# Patient Record
Sex: FEMALE | Race: WHITE | NOT HISPANIC OR LATINO
[De-identification: names, ages, dates, MRNs, and addresses within clinical notes are randomized per-mention and may not be internally consistent; named-entity substitution may affect disease eponyms.]

---

## 2019-05-31 ENCOUNTER — NON-APPOINTMENT (OUTPATIENT)
Age: 24
End: 2019-05-31

## 2019-05-31 ENCOUNTER — APPOINTMENT (OUTPATIENT)
Dept: INTERNAL MEDICINE | Facility: CLINIC | Age: 24
End: 2019-05-31
Payer: COMMERCIAL

## 2019-05-31 ENCOUNTER — RECORD ABSTRACTING (OUTPATIENT)
Age: 24
End: 2019-05-31

## 2019-05-31 VITALS
BODY MASS INDEX: 20.14 KG/M2 | HEIGHT: 64 IN | OXYGEN SATURATION: 99 % | SYSTOLIC BLOOD PRESSURE: 100 MMHG | WEIGHT: 118 LBS | HEART RATE: 62 BPM | DIASTOLIC BLOOD PRESSURE: 60 MMHG

## 2019-05-31 DIAGNOSIS — Z82.49 FAMILY HISTORY OF ISCHEMIC HEART DISEASE AND OTHER DISEASES OF THE CIRCULATORY SYSTEM: ICD-10-CM

## 2019-05-31 DIAGNOSIS — D22.9 MELANOCYTIC NEVI, UNSPECIFIED: ICD-10-CM

## 2019-05-31 DIAGNOSIS — Z80.8 FAMILY HISTORY OF MALIGNANT NEOPLASM OF OTHER ORGANS OR SYSTEMS: ICD-10-CM

## 2019-05-31 DIAGNOSIS — Z80.1 FAMILY HISTORY OF MALIGNANT NEOPLASM OF TRACHEA, BRONCHUS AND LUNG: ICD-10-CM

## 2019-05-31 DIAGNOSIS — Z82.3 FAMILY HISTORY OF STROKE: ICD-10-CM

## 2019-05-31 DIAGNOSIS — Z78.9 OTHER SPECIFIED HEALTH STATUS: ICD-10-CM

## 2019-05-31 LAB — CYTOLOGY CVX/VAG DOC THIN PREP: NORMAL

## 2019-05-31 PROCEDURE — 93000 ELECTROCARDIOGRAM COMPLETE: CPT

## 2019-05-31 PROCEDURE — 36415 COLL VENOUS BLD VENIPUNCTURE: CPT

## 2019-05-31 PROCEDURE — 99395 PREV VISIT EST AGE 18-39: CPT | Mod: 25

## 2019-06-04 LAB
25(OH)D3 SERPL-MCNC: 23.8 NG/ML
ALBUMIN SERPL ELPH-MCNC: 4.7 G/DL
ALP BLD-CCNC: 58 U/L
ALT SERPL-CCNC: 12 U/L
ANION GAP SERPL CALC-SCNC: 14 MMOL/L
AST SERPL-CCNC: 13 U/L
BASOPHILS # BLD AUTO: 0.03 K/UL
BASOPHILS NFR BLD AUTO: 0.4 %
BILIRUB SERPL-MCNC: 0.3 MG/DL
BUN SERPL-MCNC: 12 MG/DL
CALCIUM SERPL-MCNC: 9.6 MG/DL
CHLORIDE SERPL-SCNC: 108 MMOL/L
CHOLEST SERPL-MCNC: 166 MG/DL
CHOLEST/HDLC SERPL: 2.6 RATIO
CO2 SERPL-SCNC: 20 MMOL/L
CREAT SERPL-MCNC: 0.69 MG/DL
EOSINOPHIL # BLD AUTO: 0.1 K/UL
EOSINOPHIL NFR BLD AUTO: 1.4 %
ESTIMATED AVERAGE GLUCOSE: 97 MG/DL
GLUCOSE SERPL-MCNC: 87 MG/DL
HBA1C MFR BLD HPLC: 5 %
HCT VFR BLD CALC: 40.5 %
HCV AB SER QL: NONREACTIVE
HCV S/CO RATIO: 0.21 S/CO
HDLC SERPL-MCNC: 65 MG/DL
HGB BLD-MCNC: 12.9 G/DL
HIV1+2 AB SPEC QL IA.RAPID: NONREACTIVE
IMM GRANULOCYTES NFR BLD AUTO: 0.1 %
LDLC SERPL CALC-MCNC: 90 MG/DL
LYMPHOCYTES # BLD AUTO: 1.26 K/UL
LYMPHOCYTES NFR BLD AUTO: 17.6 %
MAN DIFF?: NORMAL
MCHC RBC-ENTMCNC: 29.2 PG
MCHC RBC-ENTMCNC: 31.9 GM/DL
MCV RBC AUTO: 91.6 FL
MONOCYTES # BLD AUTO: 0.42 K/UL
MONOCYTES NFR BLD AUTO: 5.9 %
NEUTROPHILS # BLD AUTO: 5.35 K/UL
NEUTROPHILS NFR BLD AUTO: 74.6 %
PLATELET # BLD AUTO: 219 K/UL
POTASSIUM SERPL-SCNC: 4.8 MMOL/L
PROT SERPL-MCNC: 7.2 G/DL
RBC # BLD: 4.42 M/UL
RBC # FLD: 13.2 %
SODIUM SERPL-SCNC: 142 MMOL/L
T4 SERPL-MCNC: 8.6 UG/DL
TRIGL SERPL-MCNC: 55 MG/DL
TSH SERPL-ACNC: 1.35 UIU/ML
VIT B12 SERPL-MCNC: 483 PG/ML
WBC # FLD AUTO: 7.17 K/UL

## 2019-06-04 NOTE — HISTORY OF PRESENT ILLNESS
[de-identified] : Patient here for an annual exam. Currently feels fine has no complaints today [FreeTextEntry1] : annual

## 2019-06-04 NOTE — HEALTH RISK ASSESSMENT
[No falls in past year] : Patient reported no falls in the past year [0] : 2) Feeling down, depressed, or hopeless: Not at all (0) [HIV Test offered] : HIV Test offered [Hepatitis C test offered] : Hepatitis C test offered [Very Good] : ~his/her~  mood as very good [] : No [MammogramDate] : never [PapSmearDate] : 2019 [ColonoscopyDate] : niecy [BoneDensityDate] : never

## 2020-02-14 ENCOUNTER — TRANSCRIPTION ENCOUNTER (OUTPATIENT)
Age: 25
End: 2020-02-14

## 2020-06-17 ENCOUNTER — APPOINTMENT (OUTPATIENT)
Dept: OBGYN | Facility: CLINIC | Age: 25
End: 2020-06-17
Payer: COMMERCIAL

## 2020-06-17 VITALS
WEIGHT: 120 LBS | SYSTOLIC BLOOD PRESSURE: 110 MMHG | BODY MASS INDEX: 20.49 KG/M2 | TEMPERATURE: 99.7 F | DIASTOLIC BLOOD PRESSURE: 80 MMHG | HEIGHT: 64 IN

## 2020-06-17 DIAGNOSIS — Z11.3 ENCOUNTER FOR SCREENING FOR INFECTIONS WITH A PREDOMINANTLY SEXUAL MODE OF TRANSMISSION: ICD-10-CM

## 2020-06-17 DIAGNOSIS — Z01.419 ENCOUNTER FOR GYNECOLOGICAL EXAMINATION (GENERAL) (ROUTINE) W/OUT ABNORMAL FINDINGS: ICD-10-CM

## 2020-06-17 PROCEDURE — 99395 PREV VISIT EST AGE 18-39: CPT

## 2020-06-22 LAB
C TRACH RRNA SPEC QL NAA+PROBE: NOT DETECTED
CYTOLOGY CVX/VAG DOC THIN PREP: NORMAL
N GONORRHOEA RRNA SPEC QL NAA+PROBE: NOT DETECTED
SOURCE TP AMPLIFICATION: NORMAL

## 2020-06-26 ENCOUNTER — RESULT REVIEW (OUTPATIENT)
Age: 25
End: 2020-06-26

## 2020-06-28 ENCOUNTER — RESULT REVIEW (OUTPATIENT)
Age: 25
End: 2020-06-28

## 2020-06-30 ENCOUNTER — RESULT REVIEW (OUTPATIENT)
Age: 25
End: 2020-06-30

## 2020-07-28 ENCOUNTER — TRANSCRIPTION ENCOUNTER (OUTPATIENT)
Age: 25
End: 2020-07-28

## 2020-07-28 ENCOUNTER — APPOINTMENT (OUTPATIENT)
Dept: INTERNAL MEDICINE | Facility: CLINIC | Age: 25
End: 2020-07-28
Payer: COMMERCIAL

## 2020-07-28 VITALS
DIASTOLIC BLOOD PRESSURE: 78 MMHG | HEIGHT: 64 IN | BODY MASS INDEX: 20.49 KG/M2 | HEART RATE: 64 BPM | SYSTOLIC BLOOD PRESSURE: 110 MMHG | WEIGHT: 120 LBS | OXYGEN SATURATION: 98 %

## 2020-07-28 PROCEDURE — 99395 PREV VISIT EST AGE 18-39: CPT | Mod: 25

## 2020-07-28 PROCEDURE — 36415 COLL VENOUS BLD VENIPUNCTURE: CPT

## 2020-07-28 PROCEDURE — G0442 ANNUAL ALCOHOL SCREEN 15 MIN: CPT

## 2020-07-29 LAB
25(OH)D3 SERPL-MCNC: 22.7 NG/ML
ALBUMIN SERPL ELPH-MCNC: 4.7 G/DL
ALP BLD-CCNC: 52 U/L
ALT SERPL-CCNC: 9 U/L
ANION GAP SERPL CALC-SCNC: 12 MMOL/L
AST SERPL-CCNC: 12 U/L
BASOPHILS # BLD AUTO: 0.02 K/UL
BASOPHILS NFR BLD AUTO: 0.3 %
BILIRUB SERPL-MCNC: 0.4 MG/DL
BUN SERPL-MCNC: 12 MG/DL
CALCIUM SERPL-MCNC: 9.5 MG/DL
CHLORIDE SERPL-SCNC: 103 MMOL/L
CHOLEST SERPL-MCNC: 176 MG/DL
CHOLEST/HDLC SERPL: 2.6 RATIO
CO2 SERPL-SCNC: 22 MMOL/L
CREAT SERPL-MCNC: 0.75 MG/DL
EOSINOPHIL # BLD AUTO: 0.12 K/UL
EOSINOPHIL NFR BLD AUTO: 2 %
ESTIMATED AVERAGE GLUCOSE: 103 MG/DL
GLUCOSE SERPL-MCNC: 102 MG/DL
HBA1C MFR BLD HPLC: 5.2 %
HCT VFR BLD CALC: 38.4 %
HDLC SERPL-MCNC: 67 MG/DL
HGB BLD-MCNC: 11.7 G/DL
IMM GRANULOCYTES NFR BLD AUTO: 0.3 %
LDLC SERPL CALC-MCNC: 98 MG/DL
LYMPHOCYTES # BLD AUTO: 1.55 K/UL
LYMPHOCYTES NFR BLD AUTO: 26.3 %
MAN DIFF?: NORMAL
MCHC RBC-ENTMCNC: 26.7 PG
MCHC RBC-ENTMCNC: 30.5 GM/DL
MCV RBC AUTO: 87.7 FL
MONOCYTES # BLD AUTO: 0.4 K/UL
MONOCYTES NFR BLD AUTO: 6.8 %
NEUTROPHILS # BLD AUTO: 3.79 K/UL
NEUTROPHILS NFR BLD AUTO: 64.3 %
PLATELET # BLD AUTO: 217 K/UL
POTASSIUM SERPL-SCNC: 4.4 MMOL/L
PROT SERPL-MCNC: 7 G/DL
RBC # BLD: 4.38 M/UL
RBC # FLD: 13.9 %
SODIUM SERPL-SCNC: 137 MMOL/L
T4 SERPL-MCNC: 8.5 UG/DL
TRIGL SERPL-MCNC: 54 MG/DL
TSH SERPL-ACNC: 2.23 UIU/ML
VIT B12 SERPL-MCNC: 431 PG/ML
WBC # FLD AUTO: 5.9 K/UL

## 2020-09-30 ENCOUNTER — APPOINTMENT (OUTPATIENT)
Dept: INTERNAL MEDICINE | Facility: CLINIC | Age: 25
End: 2020-09-30
Payer: COMMERCIAL

## 2020-09-30 DIAGNOSIS — R59.0 LOCALIZED ENLARGED LYMPH NODES: ICD-10-CM

## 2020-09-30 PROCEDURE — 99213 OFFICE O/P EST LOW 20 MIN: CPT

## 2020-10-01 PROBLEM — R59.0 ENLARGED LYMPH NODE IN NECK: Status: ACTIVE | Noted: 2020-10-01

## 2020-11-20 ENCOUNTER — APPOINTMENT (OUTPATIENT)
Dept: INTERNAL MEDICINE | Facility: CLINIC | Age: 25
End: 2020-11-20

## 2020-12-23 PROBLEM — Z01.419 ENCOUNTER FOR ANNUAL ROUTINE GYNECOLOGICAL EXAMINATION: Status: RESOLVED | Noted: 2020-06-17 | Resolved: 2020-12-23

## 2021-01-15 ENCOUNTER — APPOINTMENT (OUTPATIENT)
Dept: INTERNAL MEDICINE | Facility: CLINIC | Age: 26
End: 2021-01-15

## 2021-01-20 ENCOUNTER — TRANSCRIPTION ENCOUNTER (OUTPATIENT)
Age: 26
End: 2021-01-20

## 2021-02-11 ENCOUNTER — APPOINTMENT (OUTPATIENT)
Dept: INTERNAL MEDICINE | Facility: CLINIC | Age: 26
End: 2021-02-11
Payer: COMMERCIAL

## 2021-02-11 ENCOUNTER — NON-APPOINTMENT (OUTPATIENT)
Age: 26
End: 2021-02-11

## 2021-02-11 VITALS
OXYGEN SATURATION: 98 % | HEART RATE: 72 BPM | HEIGHT: 64 IN | WEIGHT: 120 LBS | BODY MASS INDEX: 20.49 KG/M2 | DIASTOLIC BLOOD PRESSURE: 78 MMHG | SYSTOLIC BLOOD PRESSURE: 110 MMHG

## 2021-02-11 DIAGNOSIS — R94.31 ABNORMAL ELECTROCARDIOGRAM [ECG] [EKG]: ICD-10-CM

## 2021-02-11 DIAGNOSIS — R07.89 OTHER CHEST PAIN: ICD-10-CM

## 2021-02-11 PROCEDURE — 99072 ADDL SUPL MATRL&STAF TM PHE: CPT

## 2021-02-11 PROCEDURE — 93000 ELECTROCARDIOGRAM COMPLETE: CPT

## 2021-02-11 PROCEDURE — 99214 OFFICE O/P EST MOD 30 MIN: CPT | Mod: 25

## 2021-02-12 ENCOUNTER — APPOINTMENT (OUTPATIENT)
Dept: INTERNAL MEDICINE | Facility: CLINIC | Age: 26
End: 2021-02-12

## 2021-02-15 PROBLEM — R94.31 ABNORMAL EKG: Status: ACTIVE | Noted: 2021-02-15

## 2021-02-16 PROBLEM — R07.89 MUSCULAR CHEST PAIN: Status: ACTIVE | Noted: 2021-02-15

## 2021-05-27 ENCOUNTER — APPOINTMENT (OUTPATIENT)
Dept: INTERNAL MEDICINE | Facility: CLINIC | Age: 26
End: 2021-05-27
Payer: COMMERCIAL

## 2021-05-27 VITALS
OXYGEN SATURATION: 98 % | SYSTOLIC BLOOD PRESSURE: 110 MMHG | WEIGHT: 120 LBS | HEART RATE: 78 BPM | DIASTOLIC BLOOD PRESSURE: 76 MMHG | BODY MASS INDEX: 20.49 KG/M2 | HEIGHT: 64 IN

## 2021-05-27 DIAGNOSIS — Z23 ENCOUNTER FOR IMMUNIZATION: ICD-10-CM

## 2021-05-27 DIAGNOSIS — Z11.1 ENCOUNTER FOR SCREENING FOR RESPIRATORY TUBERCULOSIS: ICD-10-CM

## 2021-05-27 PROCEDURE — 99214 OFFICE O/P EST MOD 30 MIN: CPT | Mod: 25

## 2021-05-27 PROCEDURE — 86580 TB INTRADERMAL TEST: CPT

## 2021-05-28 PROBLEM — Z11.1 PPD SCREENING TEST: Status: ACTIVE | Noted: 2021-05-28

## 2021-06-02 ENCOUNTER — RESULT REVIEW (OUTPATIENT)
Age: 26
End: 2021-06-02

## 2021-06-02 DIAGNOSIS — N83.8 OTHER NONINFLAMMATORY DISORDERS OF OVARY, FALLOPIAN TUBE AND BROAD LIGAMENT: ICD-10-CM

## 2021-06-03 ENCOUNTER — RESULT REVIEW (OUTPATIENT)
Age: 26
End: 2021-06-03

## 2021-06-07 ENCOUNTER — APPOINTMENT (OUTPATIENT)
Dept: GYNECOLOGIC ONCOLOGY | Facility: CLINIC | Age: 26
End: 2021-06-07
Payer: COMMERCIAL

## 2021-06-07 ENCOUNTER — NON-APPOINTMENT (OUTPATIENT)
Age: 26
End: 2021-06-07

## 2021-06-07 ENCOUNTER — TRANSCRIPTION ENCOUNTER (OUTPATIENT)
Age: 26
End: 2021-06-07

## 2021-06-07 VITALS
RESPIRATION RATE: 19 BRPM | HEIGHT: 64 IN | SYSTOLIC BLOOD PRESSURE: 136 MMHG | HEART RATE: 100 BPM | BODY MASS INDEX: 20.49 KG/M2 | DIASTOLIC BLOOD PRESSURE: 91 MMHG | TEMPERATURE: 98.2 F | WEIGHT: 120 LBS | OXYGEN SATURATION: 99 %

## 2021-06-07 VITALS
HEIGHT: 64 IN | DIASTOLIC BLOOD PRESSURE: 87 MMHG | RESPIRATION RATE: 18 BRPM | WEIGHT: 133.38 LBS | TEMPERATURE: 98 F | HEART RATE: 102 BPM | OXYGEN SATURATION: 97 % | SYSTOLIC BLOOD PRESSURE: 127 MMHG

## 2021-06-07 DIAGNOSIS — Z01.818 ENCOUNTER FOR OTHER PREPROCEDURAL EXAMINATION: ICD-10-CM

## 2021-06-07 PROCEDURE — 99205 OFFICE O/P NEW HI 60 MIN: CPT

## 2021-06-07 NOTE — ASU PATIENT PROFILE, ADULT - PMH
Abdominal cramping    Dense breast  left breast mass  Enlarged lymph node    Fibroid    Ovarian mass    Pectus excavatum

## 2021-06-08 ENCOUNTER — APPOINTMENT (OUTPATIENT)
Dept: GYNECOLOGIC ONCOLOGY | Facility: HOSPITAL | Age: 26
End: 2021-06-08

## 2021-06-08 ENCOUNTER — INPATIENT (INPATIENT)
Facility: HOSPITAL | Age: 26
LOS: 1 days | Discharge: HOME | DRG: 738 | End: 2021-06-10
Attending: OBSTETRICS & GYNECOLOGY | Admitting: OBSTETRICS & GYNECOLOGY
Payer: COMMERCIAL

## 2021-06-08 ENCOUNTER — RESULT REVIEW (OUTPATIENT)
Age: 26
End: 2021-06-08

## 2021-06-08 DIAGNOSIS — N83.202 UNSPECIFIED OVARIAN CYST, LEFT SIDE: ICD-10-CM

## 2021-06-08 DIAGNOSIS — C56.2 MALIGNANT NEOPLASM OF LEFT OVARY: ICD-10-CM

## 2021-06-08 DIAGNOSIS — Q67.6 PECTUS EXCAVATUM: Chronic | ICD-10-CM

## 2021-06-08 PROBLEM — R10.9 UNSPECIFIED ABDOMINAL PAIN: Chronic | Status: ACTIVE | Noted: 2021-06-07

## 2021-06-08 PROBLEM — D21.9 BENIGN NEOPLASM OF CONNECTIVE AND OTHER SOFT TISSUE, UNSPECIFIED: Chronic | Status: ACTIVE | Noted: 2021-06-07

## 2021-06-08 PROBLEM — R59.9 ENLARGED LYMPH NODES, UNSPECIFIED: Chronic | Status: ACTIVE | Noted: 2021-06-07

## 2021-06-08 LAB
ALBUMIN SERPL ELPH-MCNC: 4.7 G/DL
ALP BLD-CCNC: 56 U/L
ALT SERPL-CCNC: 13 U/L
ANION GAP SERPL CALC-SCNC: 14 MMOL/L
APTT BLD: 32.7 SEC
AST SERPL-CCNC: 16 U/L
BASOPHILS # BLD AUTO: 0.02 K/UL
BASOPHILS NFR BLD AUTO: 0.3 %
BILIRUB SERPL-MCNC: 0.5 MG/DL
BLD GP AB SCN SERPL QL: NEGATIVE — SIGNIFICANT CHANGE UP
BUN SERPL-MCNC: 9 MG/DL
CALCIUM SERPL-MCNC: 9.8 MG/DL
CANCER AG125 SERPL-ACNC: 19 U/ML
CANCER AG19-9 SERPL-ACNC: 10 U/ML
CEA SERPL-MCNC: 0.9 NG/ML
CHLORIDE SERPL-SCNC: 102 MMOL/L
CO2 SERPL-SCNC: 22 MMOL/L
CREAT SERPL-MCNC: 0.73 MG/DL
EOSINOPHIL # BLD AUTO: 0.06 K/UL
EOSINOPHIL NFR BLD AUTO: 1 %
ESTIMATED AVERAGE GLUCOSE: 97 MG/DL
HBA1C MFR BLD HPLC: 5 %
HCT VFR BLD CALC: 39.7 %
HGB BLD-MCNC: 12.8 G/DL
IMM GRANULOCYTES NFR BLD AUTO: 0.5 %
INR PPP: 1.1 RATIO
LYMPHOCYTES # BLD AUTO: 1.05 K/UL
LYMPHOCYTES NFR BLD AUTO: 18.3 %
MAN DIFF?: NORMAL
MCHC RBC-ENTMCNC: 28.6 PG
MCHC RBC-ENTMCNC: 32.2 GM/DL
MCV RBC AUTO: 88.8 FL
MONOCYTES # BLD AUTO: 0.3 K/UL
MONOCYTES NFR BLD AUTO: 5.2 %
NEUTROPHILS # BLD AUTO: 4.27 K/UL
NEUTROPHILS NFR BLD AUTO: 74.7 %
PLATELET # BLD AUTO: 221 K/UL
POTASSIUM SERPL-SCNC: 4.2 MMOL/L
PROT SERPL-MCNC: 7.5 G/DL
PT BLD: 13 SEC
RBC # BLD: 4.47 M/UL
RBC # FLD: 13.5 %
RH IG SCN BLD-IMP: POSITIVE — SIGNIFICANT CHANGE UP
SODIUM SERPL-SCNC: 138 MMOL/L
WBC # FLD AUTO: 5.73 K/UL

## 2021-06-08 PROCEDURE — 88307 TISSUE EXAM BY PATHOLOGIST: CPT | Mod: 26

## 2021-06-08 PROCEDURE — 88331 PATH CONSLTJ SURG 1 BLK 1SPC: CPT | Mod: 26

## 2021-06-08 PROCEDURE — 58950 RESECT OVARIAN MALIGNANCY: CPT

## 2021-06-08 PROCEDURE — 88342 IMHCHEM/IMCYTCHM 1ST ANTB: CPT | Mod: 26

## 2021-06-08 PROCEDURE — 88341 IMHCHEM/IMCYTCHM EA ADD ANTB: CPT | Mod: 26

## 2021-06-08 PROCEDURE — 88305 TISSUE EXAM BY PATHOLOGIST: CPT | Mod: 26

## 2021-06-08 PROCEDURE — 88304 TISSUE EXAM BY PATHOLOGIST: CPT | Mod: 26

## 2021-06-08 RX ORDER — SIMETHICONE 80 MG/1
80 TABLET, CHEWABLE ORAL EVERY 8 HOURS
Refills: 0 | Status: DISCONTINUED | OUTPATIENT
Start: 2021-06-08 | End: 2021-06-10

## 2021-06-08 RX ORDER — ACETAMINOPHEN 500 MG
1000 TABLET ORAL ONCE
Refills: 0 | Status: COMPLETED | OUTPATIENT
Start: 2021-06-08 | End: 2022-05-07

## 2021-06-08 RX ORDER — SENNA PLUS 8.6 MG/1
1 TABLET ORAL ONCE
Refills: 0 | Status: DISCONTINUED | OUTPATIENT
Start: 2021-06-08 | End: 2021-06-10

## 2021-06-08 RX ORDER — BUPIVACAINE 13.3 MG/ML
20 INJECTION, SUSPENSION, LIPOSOMAL INFILTRATION ONCE
Refills: 0 | Status: DISCONTINUED | OUTPATIENT
Start: 2021-06-08 | End: 2021-06-10

## 2021-06-08 RX ORDER — OXYCODONE HYDROCHLORIDE 5 MG/1
5 TABLET ORAL EVERY 6 HOURS
Refills: 0 | Status: DISCONTINUED | OUTPATIENT
Start: 2021-06-08 | End: 2021-06-10

## 2021-06-08 RX ORDER — HYDROMORPHONE HYDROCHLORIDE 2 MG/ML
0.2 INJECTION INTRAMUSCULAR; INTRAVENOUS; SUBCUTANEOUS
Refills: 0 | Status: DISCONTINUED | OUTPATIENT
Start: 2021-06-08 | End: 2021-06-09

## 2021-06-08 RX ORDER — KETOROLAC TROMETHAMINE 30 MG/ML
30 SYRINGE (ML) INJECTION EVERY 6 HOURS
Refills: 0 | Status: DISCONTINUED | OUTPATIENT
Start: 2021-06-08 | End: 2021-06-09

## 2021-06-08 RX ORDER — OXYCODONE HYDROCHLORIDE 5 MG/1
10 TABLET ORAL EVERY 6 HOURS
Refills: 0 | Status: DISCONTINUED | OUTPATIENT
Start: 2021-06-08 | End: 2021-06-10

## 2021-06-08 RX ORDER — PANTOPRAZOLE SODIUM 20 MG/1
40 TABLET, DELAYED RELEASE ORAL
Refills: 0 | Status: DISCONTINUED | OUTPATIENT
Start: 2021-06-08 | End: 2021-06-10

## 2021-06-08 RX ORDER — KETOROLAC TROMETHAMINE 30 MG/ML
30 SYRINGE (ML) INJECTION ONCE
Refills: 0 | Status: DISCONTINUED | OUTPATIENT
Start: 2021-06-08 | End: 2021-06-08

## 2021-06-08 RX ORDER — IBUPROFEN 200 MG
600 TABLET ORAL EVERY 6 HOURS
Refills: 0 | Status: COMPLETED | OUTPATIENT
Start: 2021-06-08

## 2021-06-08 RX ORDER — ACETAMINOPHEN 500 MG
1000 TABLET ORAL ONCE
Refills: 0 | Status: COMPLETED | OUTPATIENT
Start: 2021-06-08 | End: 2021-06-08

## 2021-06-08 RX ORDER — SODIUM CHLORIDE 9 MG/ML
1000 INJECTION, SOLUTION INTRAVENOUS
Refills: 0 | Status: DISCONTINUED | OUTPATIENT
Start: 2021-06-08 | End: 2021-06-09

## 2021-06-08 RX ORDER — HEPARIN SODIUM 5000 [USP'U]/ML
5000 INJECTION INTRAVENOUS; SUBCUTANEOUS ONCE
Refills: 0 | Status: COMPLETED | OUTPATIENT
Start: 2021-06-08 | End: 2021-06-08

## 2021-06-08 RX ORDER — GABAPENTIN 400 MG/1
600 CAPSULE ORAL ONCE
Refills: 0 | Status: COMPLETED | OUTPATIENT
Start: 2021-06-08 | End: 2021-06-08

## 2021-06-08 RX ORDER — CELECOXIB 200 MG/1
400 CAPSULE ORAL ONCE
Refills: 0 | Status: COMPLETED | OUTPATIENT
Start: 2021-06-08 | End: 2021-06-08

## 2021-06-08 RX ORDER — ONDANSETRON 8 MG/1
8 TABLET, FILM COATED ORAL EVERY 8 HOURS
Refills: 0 | Status: DISCONTINUED | OUTPATIENT
Start: 2021-06-08 | End: 2021-06-10

## 2021-06-08 RX ADMIN — HEPARIN SODIUM 5000 UNIT(S): 5000 INJECTION INTRAVENOUS; SUBCUTANEOUS at 12:04

## 2021-06-08 RX ADMIN — Medication 30 MILLIGRAM(S): at 23:20

## 2021-06-08 RX ADMIN — Medication 1000 MILLIGRAM(S): at 12:04

## 2021-06-08 RX ADMIN — CELECOXIB 400 MILLIGRAM(S): 200 CAPSULE ORAL at 12:04

## 2021-06-08 RX ADMIN — Medication 30 MILLIGRAM(S): at 23:09

## 2021-06-08 RX ADMIN — GABAPENTIN 600 MILLIGRAM(S): 400 CAPSULE ORAL at 12:04

## 2021-06-08 RX ADMIN — SIMETHICONE 80 MILLIGRAM(S): 80 TABLET, CHEWABLE ORAL at 21:12

## 2021-06-08 RX ADMIN — Medication 1000 MILLIGRAM(S): at 23:09

## 2021-06-08 NOTE — BRIEF OPERATIVE NOTE - NSICDXBRIEFPROCEDURE_GEN_ALL_CORE_FT
PROCEDURES:  Left salpingoophorectomy 08-Jun-2021 15:15:17  Berenice Stone  Omentectomy 08-Jun-2021 15:15:29  Berenice Stone  Appendectomy, open 08-Jun-2021 15:15:51  Berenice Stone

## 2021-06-08 NOTE — BRIEF OPERATIVE NOTE - SPECIMENS
Left ovary, mass and fallopian tube Left ovary, lef adnexal mass and left fallopian tube, pelvic washings

## 2021-06-08 NOTE — H&P ADULT - ASSESSMENT
26y  presenting for scheduled open salpingo-oophorectomy for large left adnexal mass   -admit to GYN for scheduled surgery  -anticipate overnight stay for open surgery   -NPO  -IV Fluids  -ERAS protocol   -Anesthesia consult

## 2021-06-08 NOTE — PHYSICAL EXAM
[Normal] : Recto-Vaginal Exam: Normal [Fully active, able to carry on all pre-disease performance without restriction] : Status 0 - Fully active, able to carry on all pre-disease performance without restriction [Abnormal] : Adnexa(ae): Abnormal [de-identified] : soft nontender smooth mass to umbilicus

## 2021-06-08 NOTE — BRIEF OPERATIVE NOTE - OPERATION/FINDINGS
Abdominal mass palpated up to zyphoid. Abdominal mass palpated up to zyphoid. Midline ex lap incision made, pelvic washings completed, LSO completed with ligasure. Uterus and right fallopian tube/ovary grossly normal appearing. Frozen section sent to pathology, possible mucinous borderline tumor, so omentectomy and appendectomy performed. Specimens to pathology. Fascia closed with loop PDS suture. SubQ closed with vicyryl. Skin closed with monocryl subcuticular sutures.

## 2021-06-08 NOTE — HISTORY OF PRESENT ILLNESS
[FreeTextEntry1] : Problem\par 1) Pelvic Mass\par \par \par Previous Therapy\par 1) Abdominal US 6/2/21\par     a) Large complex multiseptated cystic lesion int he abdomen and pelvis [id number] concerning for ovarian carcinoma, R sided mild hydronephrosis\par 2) CTAP 6/3/21\par   a) [id number] L adnexa mass imaging features consistent with mucinous cystic neoplasm\par \par 27 yo referred by Dr. Savage for large pelvic mass.

## 2021-06-08 NOTE — DISCUSSION/SUMMARY
[Reviewed Clinical Lab Test(s)] : Results of clinical tests were reviewed. [Reviewed Radiology Report(s)] : Radiology reports were reviewed. [Reviewed Radiology Film/Image(s)] : Images from radiology studies were reviewed and examined. [Discuss Alternatives/Risks/Benefits w/Patient] : All alternatives, risks, and benefits were discussed with the patient/family and all questions were answered.  Patient expressed good understanding and appreciates the importance of follow up as recommended. [Pre Op] : The differential diagnosis was discussed in detail. The indications, risks, benefits and alternatives were discussed. [unfilled] expressed an understanding of the treatment rationale and her questions were answered to her apparent satisfaction. [FreeTextEntry2] : ABDOMINOPELVIC MASS SUSPICIOUS FOR MALIGNANCY [FreeTextEntry1] : Note modified due to review of imaging with Radiologist and re counselled patient and  via phone at 11:00 am on 6/8/2021:\par \par I discussed with the patient and  with the aid of diagrams, reviewed the findings on history and physical examination, and reviewed the imaging studies in detail.   ACOG management of adnexal masses has been reviewed. \par \par We discussed the differential diagnosis which includes benign, low malignant potential tumors and malignancy. Other etiologies of adnexal masses, such as metastatic disease from another organ sites also discussed.\par \par we discussed the findings concerning for malignancy (such as solid components, increasing size, vascular septations, and elevated tumor markers) and this is included in the differntial diagosis and surgical approach.\par \par Laparoscopic cystectomy vs. unilateral salpingoophorectomy discussed. Increased risk of cyst rupture with ovarian cystectomy explained. In cases of borderline or malignancy, cyst rupture will result in upstaging and in some cases the need for chemotherapy that would not be necessary if cyst rupture was avoided. In cases of cyst a/w with endometriosis, cyst rupture is often unavoidable 2' to extensive adhesions. Cyst rupture can increase the risk of recurrence in both borderline and malignant tumors. If findings grossly concerning for malignancy a frozen section will be performed; and the appropriate additional management including but not limited to pelvic and paraaortic lymph node dissection, omentectomy and appendectomy. \par Fertility sparing management can be considered, with the understanding that additional surgery in the future will likely be necessary.  NCCN guidelines discussed.  Possible laparotomy also discussed. If diagnosis is not clear on frozen section, we will avoid any additional procedures and defer to permanent pathology. This may require additional surgery and the patient states understanding this.\par \par Complications that include, but are not limited to: bleeding, infection, injury to other organs including bowel, bladder, ureters, blood vessels, nerves; infections, blood clots, lymphedema, pneumonia, wound complications and prolonged hospital stay have all been discussed with the patient. Whenever minimally invasive surgery is attempted, there is a chance of needing to convert to laparotomy. The risk of occult injury requiring additional surgery also discussed. I have also provided her with the diagrams.  \par \par Surgical scheduling was discussed and instructions for optimization prior to surgery were given. will follow the Enhanced Recovery After Surgery (ERAS) protocol.  \par No aspirin or NSAID products for 1 week prior. \par She will choose a surgical date.\par Medically Cleared\par laparotomy via MLV, USO and frozen section.  possible staging.  \par tumor markers are normal\par \par The total encounter time was 60 minutes, of which over 50% was spent face-to-face, examining and counseling the patient, or coordinating care.  \par \par

## 2021-06-08 NOTE — BRIEF OPERATIVE NOTE - NSICDXBRIEFPOSTOP_GEN_ALL_CORE_FT
POST-OP DIAGNOSIS:  Left ovarian cyst 08-Jun-2021 15:16:43  Berenice Stone  Ovarian tumor of borderline malignancy, left 08-Jun-2021 15:17:02  Berenice Stone

## 2021-06-08 NOTE — H&P ADULT - HISTORY OF PRESENT ILLNESS
26y  presenting for scheduled salpingo-oophorectomy. The patient was referred by her Ob/Gyn Dr. Savage for a large pelvic mass. CT scan 6/3 showed large left adnexal mass, [id number] with cystic loculations. Tumor markers were wnl.     OBHx: G1 - VTOP D&C   Gyn H/x:  H/x of cysts, fibroids, endometriosis: adnexal mass as above   STIs: denies h/x of HIV, RPR, Hepatitis, G/C, Trich  PMH: Pectus excavatum s/p surgery in childhood    PSH: As a child pt had sx for pectus excavatum with metal bars placed   Meds: none  NKDA       T(C): 36.6 (21 @ 16:35), Max: 36.6 (21 @ 16:35)  HR: 102 (21 @ 16:35) (102 - 102)  BP: 127/87 (21 @ 16:35) (127/87 - 127/87)  RR: 18 (21 @ 16:35) (18 - 18)  SpO2: 97% (21 @ 16:35) (97% - 97%)    PHYSICAL EXAM:   Gen: NAD  : EUA In OR pending     LABS:  H 12.8

## 2021-06-09 ENCOUNTER — TRANSCRIPTION ENCOUNTER (OUTPATIENT)
Age: 26
End: 2021-06-09

## 2021-06-09 ENCOUNTER — RESULT REVIEW (OUTPATIENT)
Age: 26
End: 2021-06-09

## 2021-06-09 LAB
ANION GAP SERPL CALC-SCNC: 9 MMOL/L — SIGNIFICANT CHANGE UP (ref 5–17)
BUN SERPL-MCNC: 5 MG/DL — LOW (ref 7–23)
CALCIUM SERPL-MCNC: 9 MG/DL — SIGNIFICANT CHANGE UP (ref 8.4–10.5)
CHLORIDE SERPL-SCNC: 105 MMOL/L — SIGNIFICANT CHANGE UP (ref 96–108)
CO2 SERPL-SCNC: 25 MMOL/L — SIGNIFICANT CHANGE UP (ref 22–31)
CREAT SERPL-MCNC: 0.7 MG/DL — SIGNIFICANT CHANGE UP (ref 0.5–1.3)
GLUCOSE SERPL-MCNC: 100 MG/DL — HIGH (ref 70–99)
HCT VFR BLD CALC: 33.5 % — LOW (ref 34.5–45)
HGB BLD-MCNC: 10.6 G/DL — LOW (ref 11.5–15.5)
MAGNESIUM SERPL-MCNC: 1.9 MG/DL — SIGNIFICANT CHANGE UP (ref 1.6–2.6)
MCHC RBC-ENTMCNC: 28.4 PG — SIGNIFICANT CHANGE UP (ref 27–34)
MCHC RBC-ENTMCNC: 31.6 GM/DL — LOW (ref 32–36)
MCV RBC AUTO: 89.8 FL — SIGNIFICANT CHANGE UP (ref 80–100)
NRBC # BLD: 0 /100 WBCS — SIGNIFICANT CHANGE UP (ref 0–0)
PHOSPHATE SERPL-MCNC: 3.8 MG/DL — SIGNIFICANT CHANGE UP (ref 2.5–4.5)
PLATELET # BLD AUTO: 147 K/UL — LOW (ref 150–400)
POTASSIUM SERPL-MCNC: 4.4 MMOL/L — SIGNIFICANT CHANGE UP (ref 3.5–5.3)
POTASSIUM SERPL-SCNC: 4.4 MMOL/L — SIGNIFICANT CHANGE UP (ref 3.5–5.3)
RBC # BLD: 3.73 M/UL — LOW (ref 3.8–5.2)
RBC # FLD: 13.3 % — SIGNIFICANT CHANGE UP (ref 10.3–14.5)
SODIUM SERPL-SCNC: 139 MMOL/L — SIGNIFICANT CHANGE UP (ref 135–145)
WBC # BLD: 6.55 K/UL — SIGNIFICANT CHANGE UP (ref 3.8–10.5)
WBC # FLD AUTO: 6.55 K/UL — SIGNIFICANT CHANGE UP (ref 3.8–10.5)

## 2021-06-09 PROCEDURE — 88305 TISSUE EXAM BY PATHOLOGIST: CPT | Mod: 26

## 2021-06-09 PROCEDURE — 88342 IMHCHEM/IMCYTCHM 1ST ANTB: CPT | Mod: 26

## 2021-06-09 PROCEDURE — 88341 IMHCHEM/IMCYTCHM EA ADD ANTB: CPT | Mod: 26

## 2021-06-09 PROCEDURE — 88112 CYTOPATH CELL ENHANCE TECH: CPT | Mod: 26

## 2021-06-09 RX ORDER — ACETAMINOPHEN 500 MG
975 TABLET ORAL EVERY 6 HOURS
Refills: 0 | Status: DISCONTINUED | OUTPATIENT
Start: 2021-06-09 | End: 2021-06-10

## 2021-06-09 RX ORDER — IBUPROFEN 200 MG
1 TABLET ORAL
Qty: 0 | Refills: 0 | DISCHARGE
Start: 2021-06-09

## 2021-06-09 RX ORDER — ENOXAPARIN SODIUM 100 MG/ML
40 INJECTION SUBCUTANEOUS EVERY 24 HOURS
Refills: 0 | Status: DISCONTINUED | OUTPATIENT
Start: 2021-06-09 | End: 2021-06-10

## 2021-06-09 RX ORDER — SODIUM CHLORIDE 9 MG/ML
3 INJECTION INTRAMUSCULAR; INTRAVENOUS; SUBCUTANEOUS EVERY 8 HOURS
Refills: 0 | Status: DISCONTINUED | OUTPATIENT
Start: 2021-06-09 | End: 2021-06-10

## 2021-06-09 RX ORDER — MAGNESIUM SULFATE 500 MG/ML
2 VIAL (ML) INJECTION ONCE
Refills: 0 | Status: COMPLETED | OUTPATIENT
Start: 2021-06-09 | End: 2021-06-10

## 2021-06-09 RX ORDER — IBUPROFEN 200 MG
600 TABLET ORAL EVERY 6 HOURS
Refills: 0 | Status: DISCONTINUED | OUTPATIENT
Start: 2021-06-09 | End: 2021-06-10

## 2021-06-09 RX ORDER — ACETAMINOPHEN 500 MG
1000 TABLET ORAL ONCE
Refills: 0 | Status: COMPLETED | OUTPATIENT
Start: 2021-06-09 | End: 2021-06-09

## 2021-06-09 RX ADMIN — Medication 1000 MILLIGRAM(S): at 00:01

## 2021-06-09 RX ADMIN — ENOXAPARIN SODIUM 40 MILLIGRAM(S): 100 INJECTION SUBCUTANEOUS at 10:00

## 2021-06-09 RX ADMIN — Medication 975 MILLIGRAM(S): at 18:00

## 2021-06-09 RX ADMIN — Medication 600 MILLIGRAM(S): at 21:03

## 2021-06-09 RX ADMIN — Medication 975 MILLIGRAM(S): at 19:24

## 2021-06-09 RX ADMIN — Medication 30 MILLIGRAM(S): at 12:25

## 2021-06-09 RX ADMIN — SIMETHICONE 80 MILLIGRAM(S): 80 TABLET, CHEWABLE ORAL at 05:23

## 2021-06-09 RX ADMIN — Medication 30 MILLIGRAM(S): at 18:00

## 2021-06-09 RX ADMIN — OXYCODONE HYDROCHLORIDE 5 MILLIGRAM(S): 5 TABLET ORAL at 23:01

## 2021-06-09 RX ADMIN — SIMETHICONE 80 MILLIGRAM(S): 80 TABLET, CHEWABLE ORAL at 21:03

## 2021-06-09 RX ADMIN — SIMETHICONE 80 MILLIGRAM(S): 80 TABLET, CHEWABLE ORAL at 12:08

## 2021-06-09 RX ADMIN — Medication 975 MILLIGRAM(S): at 12:08

## 2021-06-09 RX ADMIN — Medication 1000 MILLIGRAM(S): at 05:23

## 2021-06-09 RX ADMIN — PANTOPRAZOLE SODIUM 40 MILLIGRAM(S): 20 TABLET, DELAYED RELEASE ORAL at 05:23

## 2021-06-09 RX ADMIN — Medication 30 MILLIGRAM(S): at 05:40

## 2021-06-09 RX ADMIN — Medication 30 MILLIGRAM(S): at 05:23

## 2021-06-09 RX ADMIN — Medication 1000 MILLIGRAM(S): at 05:40

## 2021-06-09 RX ADMIN — SODIUM CHLORIDE 3 MILLILITER(S): 9 INJECTION INTRAMUSCULAR; INTRAVENOUS; SUBCUTANEOUS at 12:12

## 2021-06-09 RX ADMIN — ONDANSETRON 8 MILLIGRAM(S): 8 TABLET, FILM COATED ORAL at 21:03

## 2021-06-09 RX ADMIN — Medication 30 MILLIGRAM(S): at 12:09

## 2021-06-09 RX ADMIN — Medication 975 MILLIGRAM(S): at 23:01

## 2021-06-09 RX ADMIN — Medication 975 MILLIGRAM(S): at 13:10

## 2021-06-09 RX ADMIN — Medication 30 MILLIGRAM(S): at 19:24

## 2021-06-09 NOTE — DISCHARGE NOTE PROVIDER - HOSPITAL COURSE
25 yo female s/p ex-lap LSO, omentectomy, appendectomy for L adnexal mass with mucinous histology on froze section. EBL 50cc, uncomplicated procedure. Patient meting post operative  milestones. Frozen section with possible mucinous borderline tumor, will f/u final pathology.

## 2021-06-09 NOTE — DISCHARGE NOTE PROVIDER - NSDCFUSCHEDAPPT_GEN_ALL_CORE_FT
[facility] ; 06/18/2021 ; \[facility]\"" [address] ; 07/12/2021 ; \[facility]\"" [address]

## 2021-06-09 NOTE — DISCHARGE NOTE PROVIDER - NSDCFUADDAPPT_GEN_ALL_CORE_FT
You have follow up appointments scheduled with Dr. Zuñiga's office on:   6/18/21 at 10:30 AM   7/12/21 at 2:00 PM     Please call the office if you need to change these appointments.

## 2021-06-09 NOTE — DISCHARGE NOTE PROVIDER - NSDCFUADDINST_GEN_ALL_CORE_FT
- Nothing in vagina - no intercourse, tampons, or douching until cleared by your doctor.   - Avoid swimming, tub baths, strenuous activity and heavy lifting until cleared by your doctor.   - Showering is ok. Pat incisions dry, do not scrub incisions.  - Continue oral pain medications as needed for pain.   - Follow up in office in 1-2 weeks for your postoperative visit.  Call the office to confirm your follow up appointment.   - Call the office sooner if you develop severe pain, heavy vaginal bleeding greater than 2 pads in 2 hours, or fevers greater than 100.4 F. Go to the closest emergency room for any of these symptoms if you are not able to contact your doctor.

## 2021-06-09 NOTE — DISCHARGE NOTE PROVIDER - CARE PROVIDER_API CALL
Yolande Zuñiga  GYNECOLOGIC ONCOLOGY  [address] [address]  Phone: [phone]  Fax: [phone]  Follow Up Time:

## 2021-06-10 ENCOUNTER — TRANSCRIPTION ENCOUNTER (OUTPATIENT)
Age: 26
End: 2021-06-10

## 2021-06-10 VITALS
SYSTOLIC BLOOD PRESSURE: 111 MMHG | DIASTOLIC BLOOD PRESSURE: 76 MMHG | HEART RATE: 78 BPM | RESPIRATION RATE: 18 BRPM | TEMPERATURE: 98 F | OXYGEN SATURATION: 99 %

## 2021-06-10 LAB
ANION GAP SERPL CALC-SCNC: 7 MMOL/L — SIGNIFICANT CHANGE UP (ref 5–17)
BASOPHILS # BLD AUTO: 0.02 K/UL — SIGNIFICANT CHANGE UP (ref 0–0.2)
BASOPHILS NFR BLD AUTO: 0.3 % — SIGNIFICANT CHANGE UP (ref 0–2)
BUN SERPL-MCNC: 6 MG/DL — LOW (ref 7–23)
CALCIUM SERPL-MCNC: 8.5 MG/DL — SIGNIFICANT CHANGE UP (ref 8.4–10.5)
CHLORIDE SERPL-SCNC: 104 MMOL/L — SIGNIFICANT CHANGE UP (ref 96–108)
CO2 SERPL-SCNC: 25 MMOL/L — SIGNIFICANT CHANGE UP (ref 22–31)
CREAT SERPL-MCNC: 0.76 MG/DL — SIGNIFICANT CHANGE UP (ref 0.5–1.3)
EOSINOPHIL # BLD AUTO: 0.1 K/UL — SIGNIFICANT CHANGE UP (ref 0–0.5)
EOSINOPHIL NFR BLD AUTO: 1.6 % — SIGNIFICANT CHANGE UP (ref 0–6)
GLUCOSE SERPL-MCNC: 87 MG/DL — SIGNIFICANT CHANGE UP (ref 70–99)
HCT VFR BLD CALC: 33.7 % — LOW (ref 34.5–45)
HGB BLD-MCNC: 10.9 G/DL — LOW (ref 11.5–15.5)
IMM GRANULOCYTES NFR BLD AUTO: 0.3 % — SIGNIFICANT CHANGE UP (ref 0–1.5)
LYMPHOCYTES # BLD AUTO: 1.3 K/UL — SIGNIFICANT CHANGE UP (ref 1–3.3)
LYMPHOCYTES # BLD AUTO: 20.8 % — SIGNIFICANT CHANGE UP (ref 13–44)
MAGNESIUM SERPL-MCNC: 1.6 MG/DL — SIGNIFICANT CHANGE UP (ref 1.6–2.6)
MCHC RBC-ENTMCNC: 29.1 PG — SIGNIFICANT CHANGE UP (ref 27–34)
MCHC RBC-ENTMCNC: 32.3 GM/DL — SIGNIFICANT CHANGE UP (ref 32–36)
MCV RBC AUTO: 90.1 FL — SIGNIFICANT CHANGE UP (ref 80–100)
MONOCYTES # BLD AUTO: 0.48 K/UL — SIGNIFICANT CHANGE UP (ref 0–0.9)
MONOCYTES NFR BLD AUTO: 7.7 % — SIGNIFICANT CHANGE UP (ref 2–14)
NEUTROPHILS # BLD AUTO: 4.33 K/UL — SIGNIFICANT CHANGE UP (ref 1.8–7.4)
NEUTROPHILS NFR BLD AUTO: 69.3 % — SIGNIFICANT CHANGE UP (ref 43–77)
NRBC # BLD: 0 /100 WBCS — SIGNIFICANT CHANGE UP (ref 0–0)
PHOSPHATE SERPL-MCNC: 2.8 MG/DL — SIGNIFICANT CHANGE UP (ref 2.5–4.5)
PLATELET # BLD AUTO: 147 K/UL — LOW (ref 150–400)
POTASSIUM SERPL-MCNC: 4.1 MMOL/L — SIGNIFICANT CHANGE UP (ref 3.5–5.3)
POTASSIUM SERPL-SCNC: 4.1 MMOL/L — SIGNIFICANT CHANGE UP (ref 3.5–5.3)
RBC # BLD: 3.74 M/UL — LOW (ref 3.8–5.2)
RBC # FLD: 13.6 % — SIGNIFICANT CHANGE UP (ref 10.3–14.5)
SODIUM SERPL-SCNC: 136 MMOL/L — SIGNIFICANT CHANGE UP (ref 135–145)
WBC # BLD: 6.25 K/UL — SIGNIFICANT CHANGE UP (ref 3.8–10.5)
WBC # FLD AUTO: 6.25 K/UL — SIGNIFICANT CHANGE UP (ref 3.8–10.5)

## 2021-06-10 PROCEDURE — 88305 TISSUE EXAM BY PATHOLOGIST: CPT

## 2021-06-10 PROCEDURE — 86850 RBC ANTIBODY SCREEN: CPT

## 2021-06-10 PROCEDURE — 83735 ASSAY OF MAGNESIUM: CPT

## 2021-06-10 PROCEDURE — 88307 TISSUE EXAM BY PATHOLOGIST: CPT

## 2021-06-10 PROCEDURE — 88341 IMHCHEM/IMCYTCHM EA ADD ANTB: CPT

## 2021-06-10 PROCEDURE — 36415 COLL VENOUS BLD VENIPUNCTURE: CPT

## 2021-06-10 PROCEDURE — 85025 COMPLETE CBC W/AUTO DIFF WBC: CPT

## 2021-06-10 PROCEDURE — 88304 TISSUE EXAM BY PATHOLOGIST: CPT

## 2021-06-10 PROCEDURE — 84100 ASSAY OF PHOSPHORUS: CPT

## 2021-06-10 PROCEDURE — 86901 BLOOD TYPING SEROLOGIC RH(D): CPT

## 2021-06-10 PROCEDURE — 88112 CYTOPATH CELL ENHANCE TECH: CPT

## 2021-06-10 PROCEDURE — 85027 COMPLETE CBC AUTOMATED: CPT

## 2021-06-10 PROCEDURE — 88331 PATH CONSLTJ SURG 1 BLK 1SPC: CPT

## 2021-06-10 PROCEDURE — 80048 BASIC METABOLIC PNL TOTAL CA: CPT

## 2021-06-10 PROCEDURE — 86900 BLOOD TYPING SEROLOGIC ABO: CPT

## 2021-06-10 RX ORDER — SODIUM,POTASSIUM PHOSPHATES 278-250MG
2 POWDER IN PACKET (EA) ORAL ONCE
Refills: 0 | Status: COMPLETED | OUTPATIENT
Start: 2021-06-10 | End: 2021-06-10

## 2021-06-10 RX ORDER — MAGNESIUM SULFATE 500 MG/ML
2 VIAL (ML) INJECTION ONCE
Refills: 0 | Status: DISCONTINUED | OUTPATIENT
Start: 2021-06-10 | End: 2021-06-10

## 2021-06-10 RX ADMIN — Medication 600 MILLIGRAM(S): at 05:00

## 2021-06-10 RX ADMIN — Medication 975 MILLIGRAM(S): at 05:01

## 2021-06-10 RX ADMIN — SODIUM CHLORIDE 3 MILLILITER(S): 9 INJECTION INTRAMUSCULAR; INTRAVENOUS; SUBCUTANEOUS at 00:01

## 2021-06-10 RX ADMIN — ENOXAPARIN SODIUM 40 MILLIGRAM(S): 100 INJECTION SUBCUTANEOUS at 11:09

## 2021-06-10 RX ADMIN — Medication 50 GRAM(S): at 11:06

## 2021-06-10 RX ADMIN — Medication 975 MILLIGRAM(S): at 00:01

## 2021-06-10 RX ADMIN — PANTOPRAZOLE SODIUM 40 MILLIGRAM(S): 20 TABLET, DELAYED RELEASE ORAL at 05:00

## 2021-06-10 RX ADMIN — OXYCODONE HYDROCHLORIDE 5 MILLIGRAM(S): 5 TABLET ORAL at 00:01

## 2021-06-10 RX ADMIN — Medication 600 MILLIGRAM(S): at 06:00

## 2021-06-10 RX ADMIN — Medication 2 TABLET(S): at 11:06

## 2021-06-10 RX ADMIN — SIMETHICONE 80 MILLIGRAM(S): 80 TABLET, CHEWABLE ORAL at 05:01

## 2021-06-10 RX ADMIN — Medication 600 MILLIGRAM(S): at 13:01

## 2021-06-10 RX ADMIN — ONDANSETRON 8 MILLIGRAM(S): 8 TABLET, FILM COATED ORAL at 07:02

## 2021-06-10 RX ADMIN — Medication 600 MILLIGRAM(S): at 13:32

## 2021-06-10 RX ADMIN — Medication 975 MILLIGRAM(S): at 06:00

## 2021-06-10 NOTE — PROGRESS NOTE ADULT - ASSESSMENT
26y female POD#0  s/p ex-lap LSO, omentectomy, appendectomy for L adnexal mass. EBL 50cc, uncomplicated procedure.     1. Neuro: tylneol atc, toradol atc, oxy prn   2. CV: VS per routine  3. Pulm: encourage IS and ambulation  4. GI: regular diet  5. : Valdovinos   6. DVT ppx: encourage ambulation, SCDs, Lovenox pending AM CBC   Dispo: pending clinical improvement 
26y female POD#2 s/p ex-lap LSO, omentectomy, appendectomy for L adnexal mass. EBL 50cc, uncomplicated procedure.     1. Neuro: tylneol, ibuprofen, oxy prn   2. CV: VS per routine  3. Pulm: encourage IS and ambulation  4. GI: regular diet  5. : Voiding   6. DVT ppx: encourage ambulation, SCDs, Lovenox   Dispo: possible d/c today pending clinical improvement 
26y female POD#1  s/p ex-lap LSO, omentectomy, appendectomy for L adnexal mass. EBL 50cc, uncomplicated procedure.     1. Neuro: tylneol atc, toradol atc, oxy prn   2. CV: VS per routine  3. Pulm: encourage IS and ambulation  4. GI: regular diet  5. : Valdovinos to be removed this morning, TOV afternoon   6. DVT ppx: encourage ambulation, SCDs, Lovenox pending AM CBC   Dispo: pending clinical improvement

## 2021-06-10 NOTE — DISCHARGE NOTE NURSING/CASE MANAGEMENT/SOCIAL WORK - PATIENT PORTAL LINK FT
You can access the FollowMyHealth Patient Portal offered by [facility] by registering at the following website: [email or web address]. By joining [employer]’s FollowMyHealth portal, you will also be able to view your health information using other applications (apps) compatible with our system.

## 2021-06-10 NOTE — PROGRESS NOTE ADULT - SUBJECTIVE AND OBJECTIVE BOX
SUBJECTIVE: Patient evaluated at bedside for post op check. She has no complaints. Pain well controlled. No nausea. No sips yet. Not OOB yet.     OBJECTIVE:  VITALS  T(C): 37.2 (06-08-21 @ 18:12), Max: 37.2 (06-08-21 @ 18:12)  HR: 95 (06-08-21 @ 18:12) (74 - 95)  BP: 115/76 (06-08-21 @ 18:12) (108/68 - 122/89)  RR: 16 (06-08-21 @ 18:12) (12 - 17)  SpO2: 97% (06-08-21 @ 18:12) (97% - 100%)    PHYSICAL EXAM   GA: NAD   Resp: normal respiratory effort  Abd: soft, NT/ND, no rebound or guarding, midline incision clean, dry   Extrem: SCDs in place, no LE edema, no LE tenderness    LABS    06-08 @ 07:01  -  06-08 @ 18:31  --------------------------------------------------------  IN: 120 mL / OUT: 200 mL / NET: -80 mL      MEDICATIONS  acetaminophen   Tablet .. 1000 milliGRAM(s) Oral Once  acetaminophen   Tablet .. 1000 milliGRAM(s) Oral Once PRN  BUpivacaine liposome 1.3% Injectable (no eMAR) 20 milliLiter(s) Local Injection Once  HYDROmorphone  Injectable 0.2 milliGRAM(s) IV Push every 15 minutes PRN  ibuprofen  Tablet. 600 milliGRAM(s) Oral every 6 hours PRN  ketorolac   Injectable 30 milliGRAM(s) IV Push every 6 hours  ondansetron Injectable 8 milliGRAM(s) IV Push every 8 hours PRN  oxyCODONE    IR 5 milliGRAM(s) Oral every 6 hours PRN  oxyCODONE    IR 10 milliGRAM(s) Oral every 6 hours PRN  senna 1 Tablet(s) Oral Once PRN  simethicone 80 milliGRAM(s) Chew every 8 hours  
SUBJECTIVE: Patient evaluated at bedside. She has no complaints. She reports pain is well controlled with medications. Tolerated diet yesterday. no nausea. Has been ambulating, no dizziness. She denies HA, SOB, CP, palpitations, n/v, fevers, chills.     OBJECTIVE:  VITALS  T(C): 36.7 (06-10-21 @ 04:54), Max: 36.7 (06-09-21 @ 20:12)  HR: 75 (06-10-21 @ 04:54) (75 - 88)  BP: 114/77 (06-10-21 @ 04:54) (106/68 - 114/77)  RR: 16 (06-10-21 @ 04:54) (16 - 18)  SpO2: 96% (06-10-21 @ 04:54) (95% - 96%)    PHYSICAL EXAM   GA: NAD   Resp: normal respiratory effort  Abd: +BS, soft, NT/ND, no rebound or guarding, well healing midline incision   Extrem: SCDs in place, no LE edema, no LE tenderness    LABS    06-09 @ 07:01  -  06-10 @ 07:00  --------------------------------------------------------  IN: 680 mL / OUT: 950 mL / NET: -270 mL                              10.6   6.55  )-----------( 147      ( 09 Jun 2021 06:01 )             33.5     06-09    139  |  105  |  5<L>  ----------------------------<  100<H>  4.4   |  25  |  0.70    Ca    9.0      09 Jun 2021 06:01  Phos  3.8     06-09  Mg     1.9     06-09        MEDICATIONS  acetaminophen   Tablet .. 975 milliGRAM(s) Oral every 6 hours  BUpivacaine liposome 1.3% Injectable (no eMAR) 20 milliLiter(s) Local Injection Once  enoxaparin Injectable 40 milliGRAM(s) SubCutaneous every 24 hours  ibuprofen  Tablet. 600 milliGRAM(s) Oral every 6 hours PRN  magnesium sulfate  IVPB 2 Gram(s) IV Intermittent once  ondansetron Injectable 8 milliGRAM(s) IV Push every 8 hours PRN  oxyCODONE    IR 5 milliGRAM(s) Oral every 6 hours PRN  oxyCODONE    IR 10 milliGRAM(s) Oral every 6 hours PRN  pantoprazole    Tablet 40 milliGRAM(s) Oral before breakfast  senna 1 Tablet(s) Oral Once PRN  simethicone 80 milliGRAM(s) Chew every 8 hours  
SUBJECTIVE: Patient evaluated at bedside. She has no complaints. tolerating diet. Not passing gas. Not OOB. She reports pain is well controlled with medications. She denies HA, dizziness, SOB, CP, palpitations, n/v, fevers, chills.     OBJECTIVE:  VITALS  T(C): 36.8 (06-09-21 @ 05:21), Max: 36.9 (06-08-21 @ 20:06)  HR: 72 (06-09-21 @ 05:21) (72 - 102)  BP: 103/69 (06-09-21 @ 05:21) (97/63 - 106/71)  RR: 16 (06-09-21 @ 05:21) (16 - 18)  SpO2: 98% (06-09-21 @ 05:21) (95% - 98%)    PHYSICAL EXAM   GA: NAD   Resp: normal respiratory effort  Abd: +BS, soft, NT/ND, no rebound or guarding, midline incision well healing with dermabond   Extrem: SCDs in place, no LE edema, no LE tenderness    LABS    06-08 @ 07:01  -  06-09 @ 07:00  --------------------------------------------------------  IN: 465 mL / OUT: 2625 mL / NET: -2160 mL                        10.6   6.55  )-----------( 147      ( 09 Jun 2021 06:01 )             33.5     06-09    139  |  105  |  5<L>  ----------------------------<  100<H>  4.4   |  25  |  0.70    Ca    9.0      09 Jun 2021 06:01  Phos  3.8     06-09  Mg     1.9     06-09        MEDICATIONS  BUpivacaine liposome 1.3% Injectable (no eMAR) 20 milliLiter(s) Local Injection Once  HYDROmorphone  Injectable 0.2 milliGRAM(s) IV Push every 15 minutes PRN  ibuprofen  Tablet. 600 milliGRAM(s) Oral every 6 hours PRN  ketorolac   Injectable 30 milliGRAM(s) IV Push every 6 hours  lactated ringers. 1000 milliLiter(s) IV Continuous <Continuous>  ondansetron Injectable 8 milliGRAM(s) IV Push every 8 hours PRN  oxyCODONE    IR 5 milliGRAM(s) Oral every 6 hours PRN  oxyCODONE    IR 10 milliGRAM(s) Oral every 6 hours PRN  pantoprazole    Tablet 40 milliGRAM(s) Oral before breakfast  senna 1 Tablet(s) Oral Once PRN  simethicone 80 milliGRAM(s) Chew every 8 hours

## 2021-06-11 ENCOUNTER — NON-APPOINTMENT (OUTPATIENT)
Age: 26
End: 2021-06-11

## 2021-06-11 LAB — NON-GYNECOLOGICAL CYTOLOGY STUDY: SIGNIFICANT CHANGE UP

## 2021-06-16 PROBLEM — N83.8 OTHER NONINFLAMMATORY DISORDERS OF OVARY, FALLOPIAN TUBE AND BROAD LIGAMENT: Chronic | Status: ACTIVE | Noted: 2021-06-07

## 2021-06-16 PROBLEM — Q67.6 PECTUS EXCAVATUM: Chronic | Status: ACTIVE | Noted: 2021-06-07

## 2021-06-16 PROBLEM — R92.2 INCONCLUSIVE MAMMOGRAM: Chronic | Status: ACTIVE | Noted: 2021-06-07

## 2021-06-18 ENCOUNTER — APPOINTMENT (OUTPATIENT)
Dept: GYNECOLOGIC ONCOLOGY | Facility: CLINIC | Age: 26
End: 2021-06-18

## 2021-06-18 ENCOUNTER — APPOINTMENT (OUTPATIENT)
Dept: GYNECOLOGIC ONCOLOGY | Facility: CLINIC | Age: 26
End: 2021-06-18
Payer: COMMERCIAL

## 2021-06-18 PROCEDURE — 99024 POSTOP FOLLOW-UP VISIT: CPT

## 2021-06-18 NOTE — REASON FOR VISIT
[FreeTextEntry1] : 1 week post op visit, telehealth\par [place] at [place] in [place], provider in office in [place]\par consent obtained for visit

## 2021-06-18 NOTE — HISTORY OF PRESENT ILLNESS
[FreeTextEntry1] : Problem\par 1) Pelvic Mass\par \par \par Previous Therapy\par 1) Abdominal US 6/2/21\par     a) Large complex multiseptated cystic lesion int he abdomen and pelvis [id number] concerning for ovarian carcinoma, R sided mild hydronephrosis\par 2) CTAP 6/3/21\par   a) [id number] L adnexa mass imaging features consistent with mucinous cystic neoplasm\par 3) Exploratory Laparatomy, LSO, appendectomy and omentectomy 6/8/2021\par    a) washing negative, pathology pending\par \par Here for 1 week post op visit, feeling well. pathology not finalized yet, sent to [facility] for second opinion. Patient taking motrin for pain. Good appetite and bowel function.

## 2021-06-18 NOTE — DISCUSSION/SUMMARY
[Reviewed Clinical Lab Test(s)] : Results of clinical tests were reviewed. [Reviewed Radiology Report(s)] : Radiology reports were reviewed. [Reviewed Radiology Film/Image(s)] : Images from radiology studies were reviewed and examined. [Discuss Alternatives/Risks/Benefits w/Patient] : All alternatives, risks, and benefits were discussed with the patient/family and all questions were answered.  Patient expressed good understanding and appreciates the importance of follow up as recommended. [Pre Op] : The differential diagnosis was discussed in detail. The indications, risks, benefits and alternatives were discussed. [unfilled] expressed an understanding of the treatment rationale and her questions were answered to her apparent satisfaction. [FreeTextEntry1] : incison appears c/d/i via video check\par patient doing well post operatively\par anxious for pathology results, will tevin as soon as we receive from [facility]\par Follow up in 3 weeks regardless of results for post op check with Dr. Zuñiga.  [FreeTextEntry2] : ABDOMINOPELVIC MASS SUSPICIOUS FOR MALIGNANCY

## 2021-06-25 DIAGNOSIS — Z11.59 ENCOUNTER FOR SCREENING FOR OTHER VIRAL DISEASES: ICD-10-CM

## 2021-06-25 DIAGNOSIS — R93.89 ABNORMAL FINDINGS ON DIAGNOSTIC IMAGING OF OTHER SPECIFIED BODY STRUCTURES: ICD-10-CM

## 2021-06-25 LAB — SURGICAL PATHOLOGY STUDY: SIGNIFICANT CHANGE UP

## 2021-07-01 ENCOUNTER — TRANSCRIPTION ENCOUNTER (OUTPATIENT)
Age: 26
End: 2021-07-01

## 2021-07-01 ENCOUNTER — APPOINTMENT (OUTPATIENT)
Dept: GASTROENTEROLOGY | Facility: HOSPITAL | Age: 26
End: 2021-07-01

## 2021-07-01 ENCOUNTER — OUTPATIENT (OUTPATIENT)
Dept: OUTPATIENT SERVICES | Facility: HOSPITAL | Age: 26
LOS: 1 days | Discharge: HOME | End: 2021-07-01
Payer: COMMERCIAL

## 2021-07-01 DIAGNOSIS — Q67.6 PECTUS EXCAVATUM: Chronic | ICD-10-CM

## 2021-07-01 PROCEDURE — 45378 DIAGNOSTIC COLONOSCOPY: CPT

## 2021-07-01 PROCEDURE — 43235 EGD DIAGNOSTIC BRUSH WASH: CPT

## 2021-07-12 ENCOUNTER — APPOINTMENT (OUTPATIENT)
Dept: GYNECOLOGIC ONCOLOGY | Facility: CLINIC | Age: 26
End: 2021-07-12

## 2021-07-12 ENCOUNTER — APPOINTMENT (OUTPATIENT)
Dept: GYNECOLOGIC ONCOLOGY | Facility: CLINIC | Age: 26
End: 2021-07-12
Payer: COMMERCIAL

## 2021-07-12 VITALS
HEART RATE: 85 BPM | RESPIRATION RATE: 18 BRPM | DIASTOLIC BLOOD PRESSURE: 84 MMHG | HEIGHT: 64 IN | OXYGEN SATURATION: 98 % | TEMPERATURE: 98.2 F | WEIGHT: 120 LBS | BODY MASS INDEX: 20.49 KG/M2 | SYSTOLIC BLOOD PRESSURE: 120 MMHG

## 2021-07-12 PROCEDURE — 99214 OFFICE O/P EST MOD 30 MIN: CPT | Mod: 24

## 2021-07-12 NOTE — PHYSICAL EXAM
[Fully active, able to carry on all pre-disease performance without restriction] : Status 0 - Fully active, able to carry on all pre-disease performance without restriction [Normal] : No focal neurologic defects observed [de-identified] : Well-healed vertical incision

## 2021-07-12 NOTE — DISCUSSION/SUMMARY
[Reviewed Clinical Lab Test(s)] : Results of clinical tests were reviewed. [Reviewed Radiology Report(s)] : Radiology reports were reviewed. [Reviewed Radiology Film/Image(s)] : Images from radiology studies were reviewed and examined. [Discuss Alternatives/Risks/Benefits w/Patient] : All alternatives, risks, and benefits were discussed with the patient/family and all questions were answered.  Patient expressed good understanding and appreciates the importance of follow up as recommended. [Diagnosis/Stage ___] : Given this data, a diagnosis of [unfilled] is rendered. 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 Romeo TSAI\par Referral to genetics\par Follow up in 3 months for surveillance

## 2021-07-12 NOTE — HISTORY OF PRESENT ILLNESS
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\par a) negative tumor markers and colonoscopy and upper endoscopy 6/2021\par 2) Mother with premenopausal breast cancer at 46yo (BRCA negative)\par \par Previous Therapy\par 1) Abdominal US 6/2/21\par     a) Large complex multiseptated cystic lesion int he abdomen and pelvis [id number] concerning for ovarian carcinoma, R sided mild hydronephrosis\par 2) CTAP 6/3/21\par   a) [id number] L adnexa mass imaging features consistent with mucinous cystic neoplasm\par 3) Exploratory Laparatomy, LSO, appendectomy and omentectomy 6/8/2021\par    a) washing negative, L ovary with G1 mucinous carcinoma, arising in background of a borderline mucinous tumor and mucinous cystadenoma, benign L tube, omentum and appendix\par \par Here for 1 month post op visit and treatment planning. Feeling well.  She had a normal menses and is feeling great.

## 2021-07-19 ENCOUNTER — APPOINTMENT (OUTPATIENT)
Dept: HEMATOLOGY ONCOLOGY | Facility: CLINIC | Age: 26
End: 2021-07-19

## 2021-08-24 ENCOUNTER — APPOINTMENT (OUTPATIENT)
Dept: INTERNAL MEDICINE | Facility: CLINIC | Age: 26
End: 2021-08-24
Payer: COMMERCIAL

## 2021-08-24 DIAGNOSIS — Z20.822 CONTACT WITH AND (SUSPECTED) EXPOSURE TO COVID-19: ICD-10-CM

## 2021-08-24 PROCEDURE — ZZZZZ: CPT

## 2021-08-25 PROBLEM — Z20.822 EXPOSURE TO COVID-19 VIRUS: Status: ACTIVE | Noted: 2021-08-24

## 2021-08-26 LAB — SARS-COV-2 N GENE NPH QL NAA+PROBE: NOT DETECTED

## 2021-10-27 ENCOUNTER — APPOINTMENT (OUTPATIENT)
Dept: GYNECOLOGIC ONCOLOGY | Facility: CLINIC | Age: 26
End: 2021-10-27
Payer: COMMERCIAL

## 2021-10-27 VITALS
HEART RATE: 82 BPM | DIASTOLIC BLOOD PRESSURE: 87 MMHG | TEMPERATURE: 97.9 F | HEIGHT: 64 IN | OXYGEN SATURATION: 97 % | BODY MASS INDEX: 21 KG/M2 | RESPIRATION RATE: 18 BRPM | WEIGHT: 123 LBS | SYSTOLIC BLOOD PRESSURE: 130 MMHG

## 2021-10-27 PROCEDURE — 99214 OFFICE O/P EST MOD 30 MIN: CPT | Mod: 25

## 2021-10-27 PROCEDURE — 36415 COLL VENOUS BLD VENIPUNCTURE: CPT

## 2021-10-27 NOTE — HISTORY OF PRESENT ILLNESS
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\par a) negative tumor markers and colonoscopy and upper endoscopy 6/2021\par 2) Mother with premenopausal breast cancer at 46yo (BRCA negative)\par \par Previous Therapy\par 1) Abdominal US 6/2/21\par     a) Large complex multiseptated cystic lesion int he abdomen and pelvis [id number] concerning for ovarian carcinoma, R sided mild hydronephrosis\par 2) CTAP 6/3/21\par   a) [id number] L adnexa mass imaging features consistent with mucinous cystic neoplasm\par 3) Exploratory Laparatomy, LSO, appendectomy and omentectomy 6/8/2021\par    a) washing negative, L ovary with G1 mucinous carcinoma, arising in background of a borderline mucinous tumor and mucinous cystadenoma, benign L tube, omentum and appendix\par \par Here for surveillance visit. Feeling well. Pt did not see genetics or [facility] for counseling. Pt reports difficulty getting colonoscopy approved. Endorses cramping on the left side when she gets her periods. Endorses menses that are regular in duration. \par Denies sharp abdominal/pelvic pain, heavy vaginal bleeding, dysuria, hematuria, bloating, constipation, N/V/D, CP, SOB. Occasionally feels sore in abdomen when she wakes up.  It usually goes away after she moves bowels.  Sexually active without complaints.  uses condoms. Would like to get pregnant.  She is using silicone strips on her incision, and she thinks it is helping.  \par \par \par Health maintenance\par Last pap June 2020\par

## 2021-10-27 NOTE — PHYSICAL EXAM
[Normal] : Anus and perineum: Normal sphincter tone, no masses, no prolapse. [Fully active, able to carry on all pre-disease performance without restriction] : Status 0 - Fully active, able to carry on all pre-disease performance without restriction [Abnormal] : Adnexa(ae): Abnormal [Uterine Adnexae] : normal right adnexa [de-identified] : Well-healed vertical incision.  mild keloid.  [FreeTextEntry1] : left absent.

## 2021-10-27 NOTE — DISCUSSION/SUMMARY
[Reviewed Clinical Lab Test(s)] : Results of clinical tests were reviewed. [Reviewed Radiology Report(s)] : Radiology reports were reviewed. [Reviewed Radiology Film/Image(s)] : Images from radiology studies were reviewed and examined. [Discuss Alternatives/Risks/Benefits w/Patient] : All alternatives, risks, and benefits were discussed with the patient/family and all questions were answered.  Patient expressed good understanding and appreciates the importance of follow up as recommended. [Diagnosis/Stage ___] : Given this data, a diagnosis of [unfilled] is rendered. 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 \par Clinically MITCH\par CEA, Ca125 drawn today (not elevated at diagnosis)\par patient should go to primary OB for counselling: referral to [facility]\par needs colonoscopy due to mucinous ovarian cancer as it is in NCCN recommendations\par TVUS\par Follow up in 3 months for surveillance

## 2021-10-28 LAB
CANCER AG125 SERPL-ACNC: 6 U/ML
CEA SERPL-MCNC: 1 NG/ML

## 2021-11-11 ENCOUNTER — APPOINTMENT (OUTPATIENT)
Dept: INTERNAL MEDICINE | Facility: CLINIC | Age: 26
End: 2021-11-11
Payer: COMMERCIAL

## 2021-11-11 VITALS
HEIGHT: 64 IN | DIASTOLIC BLOOD PRESSURE: 72 MMHG | HEART RATE: 76 BPM | BODY MASS INDEX: 21 KG/M2 | WEIGHT: 123 LBS | OXYGEN SATURATION: 99 % | SYSTOLIC BLOOD PRESSURE: 110 MMHG

## 2021-11-11 DIAGNOSIS — Z30.09 ENCOUNTER FOR OTHER GENERAL COUNSELING AND ADVICE ON CONTRACEPTION: ICD-10-CM

## 2021-11-11 PROCEDURE — 99213 OFFICE O/P EST LOW 20 MIN: CPT

## 2021-11-12 PROBLEM — Z30.09 FAMILY PLANNING COUNSELING: Status: ACTIVE | Noted: 2021-11-12

## 2022-01-19 ENCOUNTER — LABORATORY RESULT (OUTPATIENT)
Age: 27
End: 2022-01-19

## 2022-01-19 ENCOUNTER — APPOINTMENT (OUTPATIENT)
Dept: GYNECOLOGIC ONCOLOGY | Facility: CLINIC | Age: 27
End: 2022-01-19

## 2022-01-19 ENCOUNTER — APPOINTMENT (OUTPATIENT)
Dept: GYNECOLOGIC ONCOLOGY | Facility: CLINIC | Age: 27
End: 2022-01-19
Payer: COMMERCIAL

## 2022-01-19 VITALS
SYSTOLIC BLOOD PRESSURE: 128 MMHG | WEIGHT: 125 LBS | HEART RATE: 78 BPM | DIASTOLIC BLOOD PRESSURE: 87 MMHG | BODY MASS INDEX: 21.34 KG/M2 | RESPIRATION RATE: 18 BRPM | HEIGHT: 64 IN | OXYGEN SATURATION: 99 %

## 2022-01-19 PROCEDURE — 99215 OFFICE O/P EST HI 40 MIN: CPT

## 2022-01-19 NOTE — HISTORY OF PRESENT ILLNESS
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\par a) negative tumor markers and colonoscopy and upper endoscopy 6/2021\par 2) Mother with premenopausal breast cancer at 46yo (BRCA negative)\par \par Previous Therapy\par 1) Abdominal US 6/2/21\par     a) Large complex multiseptated cystic lesion int he abdomen and pelvis [id number] concerning for ovarian carcinoma, R sided mild hydronephrosis\par 2) CTAP 6/3/21\par   a) [id number] L adnexa mass imaging features consistent with mucinous cystic neoplasm\par 3) Exploratory Laparatomy, LSO, appendectomy and omentectomy 6/8/2021\par    a) washing negative, L ovary with G1 mucinous carcinoma, arising in background of a borderline mucinous tumor and mucinous cystadenoma, benign L tube, omentum and appendix\par 4. Pelvic US 11/3/21\par    a) unremarkable appearance of the right ovary. Left ovary surgically removed.\par \par Health maintenance\par Last pap June 2020\par Colonoscopy 7/2021- normal \par

## 2022-01-19 NOTE — REASON FOR VISIT
[Spouse] : spouse [Other: _____] : [unfilled] [FreeTextEntry1] : Surveillance. No acute complaints other than a bulging around the umbilicus at the end of the day. Denies abdominal pain, nausea or tenderness. \par \par Normal menses.  \par \par She has not seen EULALIO. She was planning to see an OB/GYN for preconception counseling next month and wants to try naturally. I encouraged her to still see an EULALIO for discussion of fertility preservation options and family planning. I explained that if she develops an abnormal cyst on the contralateral ovary, obtaining eggs, etc. may be more difficult/not possible. \par \par Pt underwent colonoscopy 7/1/21 with no abnormal findings. \par \par CEA and  normal 10/2021

## 2022-01-19 NOTE — ASSESSMENT
[FreeTextEntry1] : Explained to patient that she may be developing a hernia at the umbilicus, but that it is currently small and asymptomatic, so no intervention is indicated at this time. Symptoms of bowel herniation described and patient encouraged to call if she experiences increased pain or tenderness at the sight. \par \par I encouraged her to follow up in 3 months or after her pregnancy, with plan to obtain a US in 6 months, or several months after her pregnancy\par \par [] , CA 19-9, CEA\par [] Follow up in 3 months.

## 2022-01-19 NOTE — PHYSICAL EXAM
[Normal] : Recto-Vaginal Exam: Normal [de-identified] : vertical incision intact, well healed. Small defect (5mm) at the inferior aspect of the umbilicus.

## 2022-01-24 ENCOUNTER — TRANSCRIPTION ENCOUNTER (OUTPATIENT)
Age: 27
End: 2022-01-24

## 2022-02-04 ENCOUNTER — RESULT REVIEW (OUTPATIENT)
Age: 27
End: 2022-02-04

## 2022-02-16 ENCOUNTER — NON-APPOINTMENT (OUTPATIENT)
Age: 27
End: 2022-02-16

## 2022-02-16 ENCOUNTER — APPOINTMENT (OUTPATIENT)
Dept: BREAST CENTER | Facility: CLINIC | Age: 27
End: 2022-02-16
Payer: COMMERCIAL

## 2022-02-16 VITALS
HEART RATE: 100 BPM | WEIGHT: 125 LBS | BODY MASS INDEX: 21.34 KG/M2 | HEIGHT: 64 IN | DIASTOLIC BLOOD PRESSURE: 92 MMHG | SYSTOLIC BLOOD PRESSURE: 136 MMHG

## 2022-02-16 DIAGNOSIS — N63.20 UNSPECIFIED LUMP IN THE LEFT BREAST, UNSPECIFIED QUADRANT: ICD-10-CM

## 2022-02-16 DIAGNOSIS — Q67.6 PECTUS EXCAVATUM: ICD-10-CM

## 2022-02-16 DIAGNOSIS — Z80.3 FAMILY HISTORY OF MALIGNANT NEOPLASM OF BREAST: ICD-10-CM

## 2022-02-16 DIAGNOSIS — R92.8 OTHER ABNORMAL AND INCONCLUSIVE FINDINGS ON DIAGNOSTIC IMAGING OF BREAST: ICD-10-CM

## 2022-02-16 PROCEDURE — 99204 OFFICE O/P NEW MOD 45 MIN: CPT

## 2022-02-16 NOTE — PHYSICAL EXAM
[Normocephalic] : normocephalic [Atraumatic] : atraumatic [Supple] : supple [No Supraclavicular Adenopathy] : no supraclavicular adenopathy [Examined in the supine and seated position] : examined in the supine and seated position [Asymmetrical] : asymmetrical [No dominant masses] : no dominant masses in right breast  [No Nipple Retraction] : no left nipple retraction [No Nipple Discharge] : no left nipple discharge [No Axillary Lymphadenopathy] : no left axillary lymphadenopathy [No Edema] : no edema [No Rashes] : no rashes [No Ulceration] : no ulceration [de-identified] : pectus excavatum [de-identified] : L>R, there is a 5cm mobile palpable mass, mobile

## 2022-02-16 NOTE — HISTORY OF PRESENT ILLNESS
[FreeTextEntry1] : This is a 26 year old female referred for left palpable mass with abnormal imaging. The area of concern was previously biopsied on 6/20/2020  Pathology showed a benign fibroadenoma. However her most recent ultrasound has showed a significant increase in size. Ultrasound dated 2/10/22 shows a 4.5 x 2.5 x 4.3 cm lobulated hypoechoic mass with internal vascularity.  Previous ultrasound  dated 2/26/2020  measured 2.8 x 1.8 x 2.5 cm. Her family history is positive for mother being diagnosed with premenopausal breast carcinoma at age 47.\par The patient has a history of stage 1a  ovarian carcinoma (mucinous). \par \par She does SBE. \par She has not noticed a change in her breast or a breast lump on right. Left lump, nontender, larger\par She has not noticed a change in her nipple or nipple area.\par She has not noticed a change in the skin of the breast.\par She is not experiencing nipple discharge.\par She is not experiencing breast pain.\par She has not noticed a lump or lymph node under the armpit. \par \par BREAST CANCER RISK FACTORS\par Menarche: 10\par Date of LMP: 2/1/2022\par Menopause: pre \par Grav:  1    Para: 0\par Age at first live birth: n/a \par Nursed: n/a \par Hysterectomy: N \par Oophorectomy: left ovary removed 7/8/2021 - stage 1a ovarican CA \par OCP: IUD for 3 years in the past\par HRT: No \par Last pap/pelvic exam: 2/4/2022 WNL . \par Related family history: Mother Breast CA age 47 \par Ashkenazi: No \par Mastery risk asse no mother negative\par Bra size: B\par \par Last mammogram:      never                        Location:\par Report reviewed.                                 Images reviewed.\par Results:\par \par Last ultrasound:  2/10/2022                                 Location: [facility]\par Report reviewed.                                 Images reviewed. \par Results: Birads 4\par 4.5 x 2.5 x 4.3 cm lobulated hypoechoic mass with internal vascularity increasing in size. Previously measuring 2.8 x 1.8 x 2.5 cm. This area corresponds to previously biopsied fibroadenoma and palpable area of concern.\par \par Last MRI:                                             Location:\par Report reviewed.\par

## 2022-02-16 NOTE — ASSESSMENT
[FreeTextEntry1] : 25 yo female with high risk\par reviewed her risk status\par We reviewed risk reduction strategies including maintaining a BMI <25, limiting red meat intake and alcoholic beverages to 3 per week and exercise (150 min/ week low intensity or 75 min/week high intensity). And maintaining a normal vitamin D level.\par discussed genetic testing, she is interested and would like to discuss pre-implantation options\par discussed MRI, she is amenable to this, reviewed risk of false positives\par plan MRI with next cycle\par re-bx left mass\par plan mg/sono at 36\par discussed excisional bx, pros/cons\par We reviewed the option of observation alone. We also reviewed the procedure of excision. We reviewed postop care and recovery. We reviewed the risks of infection, bleeding, hematoma, seroma, deformity, scarring and change of sensation particularly in the nipple with possible loss of sensation and possibility of breast feeding complications. I outlined the procedure and what she should expect on the day of surgery. She understands all of the above and her questions have been answered. She will see Dr. Savage for medical clearance and get preop covid testing. Will d/w Dr. Aponte for recommendations on when she should be ok to start trying for conceiving. \par \par She knows to call or return sooner should any concerns or questions arise.\par \par

## 2022-02-16 NOTE — REVIEW OF SYSTEMS
[Abdominal Pain] : abdominal pain [Breast Pain] : breast pain [Breast Lump] : breast lump [Anxiety] : anxiety [Negative] : Heme/Lymph

## 2022-02-25 ENCOUNTER — TRANSCRIPTION ENCOUNTER (OUTPATIENT)
Age: 27
End: 2022-02-25

## 2022-03-01 ENCOUNTER — NON-APPOINTMENT (OUTPATIENT)
Age: 27
End: 2022-03-01

## 2022-03-15 ENCOUNTER — NON-APPOINTMENT (OUTPATIENT)
Age: 27
End: 2022-03-15

## 2022-03-31 ENCOUNTER — APPOINTMENT (OUTPATIENT)
Dept: BREAST CENTER | Facility: HOSPITAL | Age: 27
End: 2022-03-31
Payer: COMMERCIAL

## 2022-03-31 PROCEDURE — 19125 EXCISION BREAST LESION: CPT | Mod: LT,59

## 2022-03-31 PROCEDURE — 76098 X-RAY EXAM SURGICAL SPECIMEN: CPT | Mod: 26

## 2022-03-31 PROCEDURE — 14301 TIS TRNFR ANY 30.1-60 SQ CM: CPT

## 2022-04-08 ENCOUNTER — APPOINTMENT (OUTPATIENT)
Dept: BREAST CENTER | Facility: CLINIC | Age: 27
End: 2022-04-08
Payer: COMMERCIAL

## 2022-04-08 PROCEDURE — 99024 POSTOP FOLLOW-UP VISIT: CPT

## 2022-04-08 NOTE — ASSESSMENT
[FreeTextEntry1] : doing well\par path reviewed copy given\par discussed genetic testing she will get it asap\par f/u 1 month\par She knows to call or return sooner should any concerns or questions arise.\par

## 2022-04-08 NOTE — HISTORY OF PRESENT ILLNESS
[FreeTextEntry1] : This is a 26 year old female referred for left palpable mass with abnormal imaging. The area of concern was previously biopsied on 6/20/2020  Pathology showed a benign fibroadenoma. However her most recent ultrasound has showed a significant increase in size. Ultrasound dated 2/10/22 shows a 4.5 x 2.5 x 4.3 cm lobulated hypoechoic mass with internal vascularity.  Previous ultrasound  dated 2/26/2020  measured 2.8 x 1.8 x 2.5 cm. Her family history is positive for mother being diagnosed with premenopausal breast carcinoma at age 47.\par The patient has a history of stage 1a  ovarian carcinoma (mucinous). \par \par She is s/p left ebbx 3/31/2022 pathology showed FA. She is doing well postop.\par \par She does SBE. \par She has not noticed a change in her breast or a breast lump on right. Left lump, nontender, larger\par She has not noticed a change in her nipple or nipple area.\par She has not noticed a change in the skin of the breast.\par She is not experiencing nipple discharge.\par She is not experiencing breast pain.\par She has not noticed a lump or lymph node under the armpit. \par \par BREAST CANCER RISK FACTORS\par Menarche: 10\par Date of LMP: 2/1/2022\par Menopause: pre \par Grav:  1    Para: 0\par Age at first live birth: n/a \par Nursed: n/a \par Hysterectomy: N \par Oophorectomy: left ovary removed 7/8/2021 - stage 1a ovarican CA \par OCP: IUD for 3 years in the past\par HRT: No \par Last pap/pelvic exam: 2/4/2022 WNL . \par Related family history: Mother Breast CA age 47 \par Ashkenazi: No \par Mastery risk asse no mother negative\par Bra size: B\par \par Last mammogram:      never                        Location:\par Report reviewed.                                 Images reviewed.\par Results:\par \par Last ultrasound:  2/10/2022                                 Location: [facility]\par Report reviewed.                                 Images reviewed. \par Results: Birads 4\par 4.5 x 2.5 x 4.3 cm lobulated hypoechoic mass with internal vascularity increasing in size. Previously measuring 2.8 x 1.8 x 2.5 cm. This area corresponds to previously biopsied fibroadenoma and palpable area of concern.\par \par Last MRI:                                             Location:\par Report reviewed.\par

## 2022-04-11 PROBLEM — Z11.59 SCREENING FOR VIRAL DISEASE: Status: ACTIVE | Noted: 2021-06-25

## 2022-04-16 ENCOUNTER — TRANSCRIPTION ENCOUNTER (OUTPATIENT)
Age: 27
End: 2022-04-16

## 2022-04-18 ENCOUNTER — OUTPATIENT (OUTPATIENT)
Dept: OUTPATIENT SERVICES | Facility: HOSPITAL | Age: 27
LOS: 1 days | Discharge: HOME | End: 2022-04-18

## 2022-04-18 ENCOUNTER — APPOINTMENT (OUTPATIENT)
Dept: GYNECOLOGIC ONCOLOGY | Facility: CLINIC | Age: 27
End: 2022-04-18
Payer: COMMERCIAL

## 2022-04-18 VITALS
OXYGEN SATURATION: 99 % | TEMPERATURE: 97.3 F | HEIGHT: 64 IN | BODY MASS INDEX: 22.02 KG/M2 | SYSTOLIC BLOOD PRESSURE: 131 MMHG | HEART RATE: 99 BPM | DIASTOLIC BLOOD PRESSURE: 87 MMHG | WEIGHT: 129 LBS

## 2022-04-18 DIAGNOSIS — Z15.89 GENETIC SUSCEPTIBILITY TO OTHER DISEASE: ICD-10-CM

## 2022-04-18 DIAGNOSIS — Z80.8 FAMILY HISTORY OF MALIGNANT NEOPLASM OF OTHER ORGANS OR SYSTEMS: ICD-10-CM

## 2022-04-18 DIAGNOSIS — Q67.6 PECTUS EXCAVATUM: Chronic | ICD-10-CM

## 2022-04-18 PROCEDURE — 99214 OFFICE O/P EST MOD 30 MIN: CPT

## 2022-04-20 ENCOUNTER — NON-APPOINTMENT (OUTPATIENT)
Age: 27
End: 2022-04-20

## 2022-04-20 LAB
CANCER AG125 SERPL-ACNC: 9 U/ML
CANCER AG15-3 SERPL-ACNC: 14.1 U/ML
CANCER AG19-9 SERPL-ACNC: 9 U/ML
CEA SERPL-MCNC: 0.6 NG/ML

## 2022-04-21 ENCOUNTER — APPOINTMENT (OUTPATIENT)
Dept: HEMATOLOGY ONCOLOGY | Facility: CLINIC | Age: 27
End: 2022-04-21

## 2022-05-18 ENCOUNTER — APPOINTMENT (OUTPATIENT)
Dept: BREAST CENTER | Facility: CLINIC | Age: 27
End: 2022-05-18
Payer: COMMERCIAL

## 2022-05-18 DIAGNOSIS — D24.2 BENIGN NEOPLASM OF LEFT BREAST: ICD-10-CM

## 2022-05-18 DIAGNOSIS — R92.2 INCONCLUSIVE MAMMOGRAM: ICD-10-CM

## 2022-05-18 PROCEDURE — 99024 POSTOP FOLLOW-UP VISIT: CPT

## 2022-05-18 NOTE — HISTORY OF PRESENT ILLNESS
[FreeTextEntry1] : This is a 27 year old female referred for left palpable mass with abnormal imaging. The area of concern was previously biopsied on 6/20/2020  Pathology showed a benign fibroadenoma. However her most recent ultrasound has showed a significant increase in size. Ultrasound dated 2/10/22 shows a 4.5 x 2.5 x 4.3 cm lobulated hypoechoic mass with internal vascularity.  Previous ultrasound  dated 2/26/2020  measured 2.8 x 1.8 x 2.5 cm. Her family history is positive for mother being diagnosed with premenopausal breast carcinoma at age 47.\par The patient has a history of stage 1a  ovarian carcinoma (mucinous). \par \par She is s/p left ebbx 3/31/2022 pathology showed FA. She is doing well postop. \par \par She does SBE. \par She has not noticed a change in her breast or a breast lump on right. Left lump, nontender, larger\par She has not noticed a change in her nipple or nipple area.\par She has not noticed a change in the skin of the breast.\par She is not experiencing nipple discharge.\par She is not experiencing breast pain.\par She has not noticed a lump or lymph node under the armpit. \par \par BREAST CANCER RISK FACTORS\par Menarche: 10\par Date of LMP: 2/1/2022\par Menopause: pre \par Grav:  1    Para: 0\par Age at first live birth: n/a \par Nursed: n/a \par Hysterectomy: N \par Oophorectomy: left ovary removed 7/8/2021 - stage 1a ovarican CA \par OCP: IUD for 3 years in the past\par HRT: No \par Last pap/pelvic exam: 2/4/2022 WNL . \par Related family history: Mother Breast CA age 47 \par Ashkenazi: No \par Mastery risk asse no mother negative\par Bra size: B\par \par Last mammogram:      never                        Location:\par Report reviewed.                                 Images reviewed.\par Results:\par \par Last ultrasound:  2/10/2022                                 Location: [facility]\par Report reviewed.                                 Images reviewed. \par Results: Birads 4\par 4.5 x 2.5 x 4.3 cm lobulated hypoechoic mass with internal vascularity increasing in size. Previously measuring 2.8 x 1.8 x 2.5 cm. This area corresponds to previously biopsied fibroadenoma and palpable area of concern.\par \par Last MRI:   3/10/2022                                          Location:\par Report reviewed.\par Results : BIRADS 3\par 4.3 cm lobular mass corresponding to a known enlarging palpable lesion in the left breast 8:00 axis is concordant with core bx diagnosis of fibroadenoma. There is an adjacent 5mm satellite mass.

## 2022-05-18 NOTE — ASSESSMENT
[FreeTextEntry1] : she had genetic counseling, waiting for kit\par plan mg at 35/33 baseline\par recommend cbe annually\par She knows to call or return sooner should any concerns or questions arise.\par

## 2022-05-19 NOTE — DISCUSSION/SUMMARY
[FreeTextEntry1] : The visit was provided via telehealth using real-time 2-way audio visual technology. The patient, Indira Olmedo, was located at home in [place], [place] at the time of the visit. The Genetic Counselor, Ward Nova, located at the medical office located in [place], [place] at the time of the visit, and Genetic Counseling Intern, Linda Richard, was located in [place], [place] at the time of the visit. The patient, Indira Olmedo and Providers participated in the telehealth encounter. Consent for telehealth services was given on 2022 by the patient, Indira Olmedo.\par \par REASON FOR CONSULT\par Indira Olmedo is a 27-year-old female who was referred by Dr. Vickie Nielson and Dr. Yolande Zuñiga for cancer genetic counseling and risk assessment due to her personal history of ovarian mucinous carcinoma and family history of cancer.\par \par RELEVANT MEDICAL HISTORY\par Ms. 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 \par \par OTHER MEDICAL AND SURGICAL HISTORY:\par Congenital pectus excavatum, surgically repaired at age 6. Umbilical hernia, reportedly holding off until after pregnancy. Left breast fibroadenoma (2022), treated with excisional biopsy (2022). Left breast fibroadenoma (2020). Migraines with aura. \par \par PAST OB/GYN HISTORY:\par Obstetrical History:  \par Age at Menarche: 10\par Premenopausal \par Age at First Live Birth: N/A\par Contraceptive Use: Yes, Nexplanon, 2017 to 2020, 3 years.\par Hormone Replacement Therapy: No\par \par CANCER SCREENING HISTORY:  \par Breast: Last mammogram 2021, normal. Last sonogram 2021, left biopsy recommended, see above. \par GYN: Last visit 2022, normal. \par Colon: Last colonoscopy 2021, no polyps reported. \par Skin: Last exam in 2021, normal. Frequency: annually. Patient reported a mole under her left armpit that is two different colors, she will follow-up with the Dermatologist.\par \par SOCIAL HISTORY:\par •	\par •	Tobacco-product use: No\par \par FAMILY HISTORY:\par Maternal ancestry was reported as English, Welsh, and Frisian and paternal ancestry was reported as Czech and Mohawk. No Ashkenazi Gnosticism heritage reported. A detailed family history of cancer was ascertained, see below and scanned chart for pedigree. \par \par Of note, Ms. Olmedo’s mother had genetic testing Integrated [employer] with [employer] (28 genes) which was negative (2017).\par 	\par 	RISK ASSESSMENT:\par We discussed that the type of ovarian carcinoma Ms. Olmedo had is not typically associated with a germline BRCA1/2 mutation. In addition, Ms. Olmedo’s mother previously had negative genetic testing for breast cancer and melanoma in addition to several others in 2017 which addresses her maternal history of cancer. Given these two aspects, we discussed that the likelihood of identifying a cancer predisposing gene mutation is low, and genetic testing is unlikely to provide any information as to the cause of Ms. Olmedo’s personal or family history of cancer. Given her young age at diagnosis and the fact she is currently trying to get pregnant, we discussed the option of a guidelines-based breast and gyn cancers panel. \par \par We discussed the risks, benefits and limitations, and implications of genetic testing. We also discussed the psychosocial implications of genetic testing. Possible test results were reviewed with Ms. Olmedo’s, along with associated medical management options. The Genetic Information Non-discrimination Act (MOHSEN) was also reviewed.\par \par Upon discussion of the pros and cons of genetic testing, Ms. Olmedo mentioned she wants to start trying to get pregnant soon and does not want to delay starting for genetic testing results. Ms. Olmedo would like to speak to her  about genetic testing prior to pursuing it if she chooses to do so in the future.  Ms. Olmedo was made aware that she may pursue testing at any time in the future and we remain available to her should she wish to make another appointment.\par \par PLAN:\par \par 1.	Ms. Olmedo declined genetic testing today. \par 2.	Ms. Olmedo will contact [facility] if she chooses to pursue genetic testing in the future.\par 3.	Family member’s report(s) will be scanned to [facility] secure file cabinet\par \par For any additional questions please call [facility] at [phone]. \par \par \par Ward Nova, MS, [facility]\par Genetic Counselor, [facility]\par \par Linda Richard\par Genetic Counseling Intern\par \par CC:\par Dr. Vickie Nielson \par Dr. Yolande Zuñiga\par

## 2022-06-19 NOTE — PHYSICAL EXAM
[Normal] : Recto-Vaginal Exam: Normal [de-identified] : vertical incision intact, well healed. Small defect (5mm) at the inferior aspect of the umbilicus. [Fully active, able to carry on all pre-disease performance without restriction] : Status 0 - Fully active, able to carry on all pre-disease performance without restriction

## 2022-06-19 NOTE — HISTORY OF PRESENT ILLNESS
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\par a) negative tumor markers and colonoscopy and upper endoscopy 6/2021\par 2) Mother with premenopausal breast cancer at 48yo (BRCA negative)\par \par Previous Therapy\par 1) Abdominal US 6/2/21\par     a) Large complex multiseptated cystic lesion int he abdomen and pelvis [id number] concerning for ovarian carcinoma, R sided mild hydronephrosis\par 2) CTAP 6/3/21\par   a) [id number] L adnexa mass imaging features consistent with mucinous cystic neoplasm\par 3) Exploratory Laparatomy, LSO, appendectomy and omentectomy 6/8/2021\par    a) washing negative, L ovary with G1 mucinous carcinoma, arising in background of a borderline mucinous tumor and mucinous cystadenoma, benign L tube, omentum and appendix\par 4. Pelvic US 11/3/21\par    a) unremarkable appearance of the right ovary. Left ovary surgically removed.\par 5. Left Breast bx at 8:00 3/31/2022 w mag seed c/w fibroadenoma\par \par LMP 3/28/22 now with dysmenorrhea the last three cycles. Genetic counselor on Thursday. + small umbilical hernia. otherwise feeling great. No complaints. Planning to start PNV and trying to get pregnant. \par \par Health maintenance\par Last pap June 2020, 2/22 wnl\par Colonoscopy 7/2021- normal \par

## 2022-06-19 NOTE — REASON FOR VISIT
[Spouse] : spouse [Other: _____] : [unfilled] [FreeTextEntry1] : Surveillance. \par \par She has not seen EULALIO. She was planning to see an OB/GYN for preconception counseling next month and wants to try naturally. I encouraged her to still see an EULALIO for discussion of fertility preservation options and family planning. I explained that if she develops an abnormal cyst on the contralateral ovary, obtaining eggs, etc. may be more difficult/not possible. \par \par Pt underwent colonoscopy 7/1/21 with no abnormal findings. \par \par CEA and  normal 10/2021\par CEA, , ca 19-9, normal 1/2022

## 2022-06-19 NOTE — DISCUSSION/SUMMARY
[FreeTextEntry1] : follow up in 3 months\par can try to conceive\par discussed anxiety in survivorship\par patient would like to reach out to another young patient with ovarian cancer

## 2022-06-19 NOTE — ASSESSMENT
[FreeTextEntry1] : Explained to patient that she may be developing a hernia at the umbilicus, but that it is currently small and asymptomatic, so no intervention is indicated at this time. Symptoms of bowel herniation described and patient encouraged to call if she experiences increased pain or tenderness at the sight. \par \par I encouraged her to follow up in 3 months or after her pregnancy, with plan to obtain a US in 6 months, or several months after her pregnancy\par \par [] , CA 19-9, CEA, CA 15.2\par [] Follow up in 3 months.\par [] Pelvic US in may

## 2022-07-21 ENCOUNTER — APPOINTMENT (OUTPATIENT)
Dept: INTERNAL MEDICINE | Facility: CLINIC | Age: 27
End: 2022-07-21

## 2022-07-26 ENCOUNTER — APPOINTMENT (OUTPATIENT)
Dept: INTERNAL MEDICINE | Facility: CLINIC | Age: 27
End: 2022-07-26

## 2022-07-26 VITALS
SYSTOLIC BLOOD PRESSURE: 110 MMHG | HEART RATE: 111 BPM | DIASTOLIC BLOOD PRESSURE: 72 MMHG | OXYGEN SATURATION: 98 % | WEIGHT: 132 LBS | HEIGHT: 64 IN | BODY MASS INDEX: 22.53 KG/M2

## 2022-07-26 DIAGNOSIS — Z00.00 ENCOUNTER FOR GENERAL ADULT MEDICAL EXAMINATION W/OUT ABNORMAL FINDINGS: ICD-10-CM

## 2022-07-26 PROCEDURE — 99395 PREV VISIT EST AGE 18-39: CPT | Mod: 25

## 2022-07-26 PROCEDURE — 36415 COLL VENOUS BLD VENIPUNCTURE: CPT

## 2022-08-02 LAB
25(OH)D3 SERPL-MCNC: 24.6 NG/ML
ALBUMIN SERPL ELPH-MCNC: 4.9 G/DL
ALP BLD-CCNC: 59 U/L
ALT SERPL-CCNC: 15 U/L
ANION GAP SERPL CALC-SCNC: 13 MMOL/L
AST SERPL-CCNC: 19 U/L
BASOPHILS # BLD AUTO: 0.03 K/UL
BASOPHILS NFR BLD AUTO: 0.5 %
BILIRUB SERPL-MCNC: 0.4 MG/DL
BUN SERPL-MCNC: 10 MG/DL
CALCIUM SERPL-MCNC: 9.5 MG/DL
CHLORIDE SERPL-SCNC: 103 MMOL/L
CHOLEST SERPL-MCNC: 180 MG/DL
CO2 SERPL-SCNC: 21 MMOL/L
CREAT SERPL-MCNC: 0.73 MG/DL
EGFR: 115 ML/MIN/1.73M2
EOSINOPHIL # BLD AUTO: 0.21 K/UL
EOSINOPHIL NFR BLD AUTO: 3.4 %
ESTIMATED AVERAGE GLUCOSE: 103 MG/DL
GLUCOSE SERPL-MCNC: 102 MG/DL
HBA1C MFR BLD HPLC: 5.2 %
HCG SERPL-MCNC: <1 MIU/ML
HCT VFR BLD CALC: 42.9 %
HDLC SERPL-MCNC: 77 MG/DL
HGB BLD-MCNC: 13.5 G/DL
IMM GRANULOCYTES NFR BLD AUTO: 0.3 %
LDLC SERPL CALC-MCNC: 90 MG/DL
LYMPHOCYTES # BLD AUTO: 1.8 K/UL
LYMPHOCYTES NFR BLD AUTO: 29.1 %
MAN DIFF?: NORMAL
MCHC RBC-ENTMCNC: 29.2 PG
MCHC RBC-ENTMCNC: 31.5 GM/DL
MCV RBC AUTO: 92.7 FL
MONOCYTES # BLD AUTO: 0.4 K/UL
MONOCYTES NFR BLD AUTO: 6.5 %
NEUTROPHILS # BLD AUTO: 3.73 K/UL
NEUTROPHILS NFR BLD AUTO: 60.2 %
NONHDLC SERPL-MCNC: 103 MG/DL
PLATELET # BLD AUTO: 215 K/UL
POTASSIUM SERPL-SCNC: 5.1 MMOL/L
PROT SERPL-MCNC: 7.3 G/DL
RBC # BLD: 4.63 M/UL
RBC # FLD: 14.6 %
SODIUM SERPL-SCNC: 136 MMOL/L
T4 SERPL-MCNC: 7.4 UG/DL
TRIGL SERPL-MCNC: 64 MG/DL
TSH SERPL-ACNC: 6.53 UIU/ML
VIT B12 SERPL-MCNC: 423 PG/ML
WBC # FLD AUTO: 6.19 K/UL

## 2022-08-09 ENCOUNTER — APPOINTMENT (OUTPATIENT)
Dept: INTERNAL MEDICINE | Facility: CLINIC | Age: 27
End: 2022-08-09

## 2022-08-09 ENCOUNTER — LABORATORY RESULT (OUTPATIENT)
Age: 27
End: 2022-08-09

## 2022-08-09 ENCOUNTER — RESULT REVIEW (OUTPATIENT)
Age: 27
End: 2022-08-09

## 2022-08-09 PROCEDURE — 99213 OFFICE O/P EST LOW 20 MIN: CPT | Mod: 25

## 2022-08-09 PROCEDURE — 36415 COLL VENOUS BLD VENIPUNCTURE: CPT

## 2022-08-11 NOTE — HEALTH RISK ASSESSMENT
[] :  [Significant Other] : lives with significant other [Sexually Active] : sexually active [Reports changes in hearing] : Reports no changes in hearing [Reports changes in vision] : Reports no changes in vision [Reports changes in dental health] : Reports no changes in dental health [Smoke Detector] : smoke detector [Safety elements used in home] : safety elements used in home [Seat Belt] :  uses seat belt [Sunscreen] : uses sunscreen

## 2022-08-11 NOTE — HISTORY OF PRESENT ILLNESS
[FreeTextEntry1] : annual exam  [de-identified] : 1. follows GYN oncology on a regular basis \par 2. recent removal of fibroadenoma from breast \par 3. reports feeling fine, denies chest pain or abdominal pain

## 2022-08-12 LAB
T4 SERPL-MCNC: 8.8 UG/DL
THYROGLOB AB SERPL-ACNC: 242 IU/ML
THYROPEROXIDASE AB SERPL IA-ACNC: 33.2 IU/ML
TSH SERPL-ACNC: 4.67 UIU/ML
TSI ACT/NOR SER: 0.13 IU/L

## 2022-09-07 ENCOUNTER — NON-APPOINTMENT (OUTPATIENT)
Age: 27
End: 2022-09-07

## 2022-09-28 ENCOUNTER — APPOINTMENT (OUTPATIENT)
Dept: INTERNAL MEDICINE | Facility: CLINIC | Age: 27
End: 2022-09-28

## 2022-09-28 PROCEDURE — 36415 COLL VENOUS BLD VENIPUNCTURE: CPT

## 2022-09-29 LAB
T4 FREE SERPL-MCNC: 1.3 NG/DL
T4 SERPL-MCNC: 8.9 UG/DL
TSH SERPL-ACNC: 3.7 UIU/ML

## 2022-10-04 LAB
THYROGLOB AB SERPL-ACNC: 141 IU/ML
THYROPEROXIDASE AB SERPL IA-ACNC: 26.9 IU/ML

## 2022-10-17 ENCOUNTER — APPOINTMENT (OUTPATIENT)
Dept: GYNECOLOGIC ONCOLOGY | Facility: CLINIC | Age: 27
End: 2022-10-17

## 2022-10-20 ENCOUNTER — NON-APPOINTMENT (OUTPATIENT)
Age: 27
End: 2022-10-20

## 2022-10-20 ENCOUNTER — APPOINTMENT (OUTPATIENT)
Dept: GYNECOLOGIC ONCOLOGY | Facility: CLINIC | Age: 27
End: 2022-10-20

## 2022-10-20 VITALS
OXYGEN SATURATION: 99 % | HEART RATE: 97 BPM | DIASTOLIC BLOOD PRESSURE: 86 MMHG | HEIGHT: 64 IN | TEMPERATURE: 98.1 F | SYSTOLIC BLOOD PRESSURE: 122 MMHG

## 2022-10-20 DIAGNOSIS — C56.9 MALIGNANT NEOPLASM OF UNSPECIFIED OVARY: ICD-10-CM

## 2022-10-20 PROCEDURE — 99212 OFFICE O/P EST SF 10 MIN: CPT

## 2022-10-20 RX ORDER — ERGOCALCIFEROL 1.25 MG/1
1.25 MG CAPSULE, LIQUID FILLED ORAL
Refills: 0 | Status: COMPLETED | COMMUNITY
End: 2022-10-20

## 2022-10-20 RX ORDER — SIMETHICONE 125 MG/1
125 TABLET, CHEWABLE ORAL
Qty: 4 | Refills: 0 | Status: COMPLETED | COMMUNITY
Start: 2021-06-25 | End: 2022-10-20

## 2022-10-20 RX ORDER — OXYCODONE AND ACETAMINOPHEN 5; 325 MG/1; MG/1
5-325 TABLET ORAL
Qty: 10 | Refills: 0 | Status: COMPLETED | COMMUNITY
Start: 2021-06-07 | End: 2022-10-20

## 2022-10-20 RX ORDER — POLYETHYLENE GLYCOL-3350 AND ELECTROLYTES 236; 6.74; 5.86; 2.97; 22.74 G/274.31G; G/274.31G; G/274.31G; G/274.31G; G/274.31G
236 POWDER, FOR SOLUTION ORAL
Qty: 1 | Refills: 0 | Status: COMPLETED | COMMUNITY
Start: 2021-06-25 | End: 2022-10-20

## 2022-10-20 RX ORDER — DOCUSATE SODIUM 100 MG/1
100 CAPSULE ORAL TWICE DAILY
Qty: 20 | Refills: 3 | Status: COMPLETED | COMMUNITY
Start: 2021-06-07 | End: 2022-10-20

## 2022-10-20 RX ORDER — ETONOGESTREL 68 MG/1
68 IMPLANT SUBCUTANEOUS
Refills: 0 | Status: COMPLETED | COMMUNITY
End: 2022-10-20

## 2022-10-20 RX ORDER — IBUPROFEN 800 MG/1
800 TABLET, FILM COATED ORAL 3 TIMES DAILY
Qty: 45 | Refills: 0 | Status: COMPLETED | COMMUNITY
Start: 2021-06-07 | End: 2022-10-20

## 2022-10-20 RX ORDER — SUMATRIPTAN SUCCINATE 100 MG/1
100 TABLET, FILM COATED ORAL
Refills: 0 | Status: COMPLETED | COMMUNITY
End: 2022-10-20

## 2022-10-20 NOTE — HISTORY OF PRESENT ILLNESS
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\par a) negative tumor markers and colonoscopy and upper endoscopy 6/2021\par 2) Mother with premenopausal breast cancer at 46yo (BRCA negative)\par \par Previous Therapy\par 1) Abdominal US 6/2/21\par     a) Large complex multiseptated cystic lesion int he abdomen and pelvis [id number] concerning for ovarian carcinoma, R sided mild hydronephrosis\par 2) CTAP 6/3/21\par   a) [id number] L adnexa mass imaging features consistent with mucinous cystic neoplasm\par 3) Exploratory Laparatomy, LSO, appendectomy and omentectomy 6/8/2021\par    a) washing negative, L ovary with G1 mucinous carcinoma, arising in background of a borderline mucinous tumor and mucinous cystadenoma, benign L tube, omentum and appendix\par 4. Pelvic US 11/3/21\par    a) unremarkable appearance of the right ovary. Left ovary surgically removed.\par 5. Left Breast bx at 8:00 3/31/2022 w mag seed c/w fibroadenoma\par 6) Pelvic US 4/2022 wnl \par \par Here for 6 month follow up. Menses are regular were painful but are less. TTC  x 3 months no issues now. SA no pain. Last month first cycle had spotting week before menses. \par \par Health maintenance\par Last pap June 2020, 2/22 wnl\par Colonoscopy 7/2021- normal \par Breast imaging plan - start mg at 35/33 baseline per breast surgery \par

## 2022-10-20 NOTE — DISCUSSION/SUMMARY
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

## 2022-10-20 NOTE — PHYSICAL EXAM
[Normal] : Recto-Vaginal Exam: Normal [Fully active, able to carry on all pre-disease performance without restriction] : Status 0 - Fully active, able to carry on all pre-disease performance without restriction [Chaperone Present] : A chaperone was present in the examining room during all aspects of the physical examination [de-identified] : vertical incision intact, well healed. Small defect (5mm) at the inferior aspect of the umbilicus.

## 2022-10-21 LAB
CANCER AG125 SERPL-ACNC: 9 U/ML
CANCER AG15-3 SERPL-ACNC: 16 U/ML
CANCER AG19-9 SERPL-ACNC: 8 U/ML

## 2022-10-27 ENCOUNTER — APPOINTMENT (OUTPATIENT)
Dept: INTERNAL MEDICINE | Facility: CLINIC | Age: 27
End: 2022-10-27

## 2022-10-27 PROCEDURE — 36415 COLL VENOUS BLD VENIPUNCTURE: CPT

## 2022-10-28 LAB — HCG SERPL-MCNC: 107 MIU/ML

## 2022-11-01 ENCOUNTER — APPOINTMENT (OUTPATIENT)
Dept: INTERNAL MEDICINE | Facility: CLINIC | Age: 27
End: 2022-11-01

## 2022-11-01 DIAGNOSIS — Z34.90 ENCOUNTER FOR SUPERVISION OF NORMAL PREGNANCY, UNSPECIFIED, UNSPECIFIED TRIMESTER: ICD-10-CM

## 2022-11-01 PROCEDURE — 36415 COLL VENOUS BLD VENIPUNCTURE: CPT

## 2022-11-02 PROBLEM — Z34.90 CURRENTLY PREGNANT: Status: ACTIVE | Noted: 2022-10-27

## 2022-11-02 LAB — HCG SERPL-MCNC: 1054 MIU/ML

## 2023-01-23 ENCOUNTER — NON-APPOINTMENT (OUTPATIENT)
Age: 28
End: 2023-01-23

## 2023-02-24 DIAGNOSIS — R10.2 PELVIC AND PERINEAL PAIN: ICD-10-CM

## 2023-02-27 ENCOUNTER — RESULT REVIEW (OUTPATIENT)
Age: 28
End: 2023-02-27

## 2023-03-19 ENCOUNTER — NON-APPOINTMENT (OUTPATIENT)
Age: 28
End: 2023-03-19

## 2023-04-25 ENCOUNTER — APPOINTMENT (OUTPATIENT)
Dept: GYNECOLOGIC ONCOLOGY | Facility: CLINIC | Age: 28
End: 2023-04-25

## 2023-04-25 NOTE — CONSULT LETTER
[Dear  ___] : Dear  [unfilled], [( Thank you for referring [unfilled] for consultation for _____ )] : Thank you for referring [unfilled] for consultation for [unfilled] [Please see my note below.] : Please see my note below. [Consult Closing:] : Thank you very much for allowing me to participate in the care of this patient.  If you have any questions, please do not hesitate to contact me. [Sincerely,] : Sincerely, [DrRenetta  ___] : Dr. ZARATE [FreeTextEntry3] : Vickie Nielson MS DO\par Breast Surgeon\par [facility] \[place] [address]\par  Acitretin Counseling:  I discussed with the patient the risks of acitretin including but not limited to hair loss, dry lips/skin/eyes, liver damage, hyperlipidemia, depression/suicidal ideation, photosensitivity.  Serious rare side effects can include but are not limited to pancreatitis, pseudotumor cerebri, bony changes, clot formation/stroke/heart attack.  Patient understands that alcohol is contraindicated since it can result in liver toxicity and significantly prolong the elimination of the drug by many years.

## 2023-05-24 ENCOUNTER — NON-APPOINTMENT (OUTPATIENT)
Age: 28
End: 2023-05-24

## 2023-08-17 ENCOUNTER — APPOINTMENT (OUTPATIENT)
Dept: INTERNAL MEDICINE | Facility: CLINIC | Age: 28
End: 2023-08-17

## 2023-10-28 ENCOUNTER — TRANSCRIPTION ENCOUNTER (OUTPATIENT)
Age: 28
End: 2023-10-28

## 2023-10-30 ENCOUNTER — NON-APPOINTMENT (OUTPATIENT)
Age: 28
End: 2023-10-30

## 2023-12-14 ENCOUNTER — APPOINTMENT (OUTPATIENT)
Dept: INTERNAL MEDICINE | Facility: CLINIC | Age: 28
End: 2023-12-14
Payer: COMMERCIAL

## 2023-12-14 VITALS
HEIGHT: 64 IN | SYSTOLIC BLOOD PRESSURE: 158 MMHG | OXYGEN SATURATION: 98 % | HEART RATE: 88 BPM | RESPIRATION RATE: 16 BRPM | DIASTOLIC BLOOD PRESSURE: 90 MMHG

## 2023-12-14 DIAGNOSIS — R19.00 INTRA-ABDOMINAL AND PELVIC SWELLING, MASS AND LUMP, UNSPECIFIED SITE: ICD-10-CM

## 2023-12-14 DIAGNOSIS — R03.0 ELEVATED BLOOD-PRESSURE READING, W/OUT DIAGNOSIS OF HYPERTENSION: ICD-10-CM

## 2023-12-14 DIAGNOSIS — E55.9 VITAMIN D DEFICIENCY, UNSPECIFIED: ICD-10-CM

## 2023-12-14 DIAGNOSIS — D21.9 BENIGN NEOPLASM OF CONNECTIVE AND OTHER SOFT TISSUE, UNSPECIFIED: ICD-10-CM

## 2023-12-14 PROCEDURE — 99214 OFFICE O/P EST MOD 30 MIN: CPT | Mod: 25

## 2023-12-15 PROBLEM — R03.0 ELEVATED BP WITHOUT DIAGNOSIS OF HYPERTENSION: Status: ACTIVE | Noted: 2023-12-14

## 2023-12-15 LAB
ALBUMIN SERPL ELPH-MCNC: 4.6 G/DL
ALP BLD-CCNC: 83 U/L
ALT SERPL-CCNC: 13 U/L
ANION GAP SERPL CALC-SCNC: 12 MMOL/L
AST SERPL-CCNC: 13 U/L
BASOPHILS # BLD AUTO: 0.03 K/UL
BASOPHILS NFR BLD AUTO: 0.5 %
BILIRUB SERPL-MCNC: 0.4 MG/DL
BUN SERPL-MCNC: 11 MG/DL
CALCIUM SERPL-MCNC: 9.7 MG/DL
CHLORIDE SERPL-SCNC: 104 MMOL/L
CO2 SERPL-SCNC: 25 MMOL/L
CREAT SERPL-MCNC: 0.72 MG/DL
EGFR: 116 ML/MIN/1.73M2
EOSINOPHIL # BLD AUTO: 0.18 K/UL
EOSINOPHIL NFR BLD AUTO: 3 %
FERRITIN SERPL-MCNC: 7 NG/ML
GLUCOSE SERPL-MCNC: 96 MG/DL
HCT VFR BLD CALC: 39.2 %
HGB BLD-MCNC: 12.2 G/DL
IMM GRANULOCYTES NFR BLD AUTO: 0.2 %
LYMPHOCYTES # BLD AUTO: 1.54 K/UL
LYMPHOCYTES NFR BLD AUTO: 25.8 %
MAGNESIUM SERPL-MCNC: 1.9 MG/DL
MAN DIFF?: NORMAL
MCHC RBC-ENTMCNC: 28.5 PG
MCHC RBC-ENTMCNC: 31.1 GM/DL
MCV RBC AUTO: 91.6 FL
MONOCYTES # BLD AUTO: 0.39 K/UL
MONOCYTES NFR BLD AUTO: 6.5 %
NEUTROPHILS # BLD AUTO: 3.83 K/UL
NEUTROPHILS NFR BLD AUTO: 64 %
PLATELET # BLD AUTO: 240 K/UL
POTASSIUM SERPL-SCNC: 4.6 MMOL/L
PROT SERPL-MCNC: 7.2 G/DL
RBC # BLD: 4.28 M/UL
RBC # FLD: 14.1 %
SODIUM SERPL-SCNC: 140 MMOL/L
T4 FREE SERPL-MCNC: 1.6 NG/DL
T4 SERPL-MCNC: 9.4 UG/DL
TSH SERPL-ACNC: 0.86 UIU/ML
WBC # FLD AUTO: 5.98 K/UL

## 2023-12-15 RX ORDER — AMLODIPINE BESYLATE 2.5 MG/1
2.5 TABLET ORAL
Qty: 90 | Refills: 3 | Status: ACTIVE | COMMUNITY
Start: 2023-12-15 | End: 1900-01-01

## 2023-12-15 NOTE — HISTORY OF PRESENT ILLNESS
[FreeTextEntry1] : Repeat blood pressure [de-identified] : Patient had elevated blood pressure.  Today when I repeated it again it was 130/80.  Patient reports she has been eating only cold cuts since her pregnancy.  Patient was told that she needs to do dietary adjustments and have a blood pressure cuff at home.  Otherwise there is no leg swelling no palpitations no headaches.

## 2023-12-27 ENCOUNTER — APPOINTMENT (OUTPATIENT)
Dept: SURGERY | Facility: CLINIC | Age: 28
End: 2023-12-27
Payer: COMMERCIAL

## 2023-12-27 VITALS — SYSTOLIC BLOOD PRESSURE: 132 MMHG | TEMPERATURE: 98.3 F | DIASTOLIC BLOOD PRESSURE: 79 MMHG | HEART RATE: 128 BPM

## 2023-12-27 DIAGNOSIS — K43.2 INCISIONAL HERNIA W/OUT OBSTRUCTION OR GANGRENE: ICD-10-CM

## 2023-12-27 PROCEDURE — 99204 OFFICE O/P NEW MOD 45 MIN: CPT

## 2023-12-27 RX ORDER — ERGOCALCIFEROL (VITAMIN D2) 10 MCG
27-0.8 TABLET ORAL DAILY
Qty: 90 | Refills: 0 | Status: DISCONTINUED | COMMUNITY
Start: 2020-09-30 | End: 2023-12-27

## 2023-12-27 NOTE — REVIEW OF SYSTEMS
[As Noted in HPI] : as noted in HPI [Negative] : Integumentary 0 (no pain/absence of nonverbal indicators of pain)

## 2023-12-27 NOTE — HISTORY OF PRESENT ILLNESS
[de-identified] : 29 yo F with h/o ovarian cancer s/p ex-lap and oophorectomy and recent pregnancy (now 8 weeks postpartum) presents for evaluation of incisional hernia. She reports she had a small hernia develop after her ovarian surgery but with the pregnancy it has become significantly larger. It is reducible and she denies episodes of bowel obstruction from incarceration. It is sometimes more symptomatic than other times, but she is always able to push it in. She is interested in having another child soon and so is considering her options for hernia repair.

## 2023-12-27 NOTE — PHYSICAL EXAM
[Respiratory Effort] : normal respiratory effort [Normal Rate and Rhythm] : normal rate and rhythm [No Rash or Lesion] : No rash or lesion [Calm] : calm [de-identified] : NAD, well-appearing [de-identified] : Anicteric [de-identified] : soft, nondistended, nontender with ~ 4cm reducible incisional hernia near umbilicus. No overlying skin changes

## 2023-12-27 NOTE — CONSULT LETTER
[Dear  ___] : Dear  [unfilled], [( Thank you for referring [unfilled] for consultation for _____ )] : Thank you for referring [unfilled] for consultation for [unfilled] [Please see my note below.] : Please see my note below. [Consult Closing:] : Thank you very much for allowing me to participate in the care of this patient.  If you have any questions, please do not hesitate to contact me. [Sincerely,] : Sincerely, [FreeTextEntry3] : Yanely Galloway MD

## 2023-12-27 NOTE — ASSESSMENT
[FreeTextEntry1] : 29 yo F with moderate-sized reducible hernia.  We had a long discussion about reasons for repair, timing of repair and approach for repair. For definitive management, I recommended Robotic-assisted repair with mesh. Given her desire for pregnancy in the near future, I would recommend waiting to do this until she is no longer interested in carrying a child. For now, we can either perform a primary repair with the knowledge of a higher change of recurrence or continue with observation. We discussed the risks and benefits of all approaches including the risk of bowel incarceration and need for emergency surgery. I also explained how to reduce a hernia if there is such an episode or if unable to go to the ED. We discussed expected recovery of both types of repair. Patient and her  would like to think about this and will let me know if a primary repair is desired prior to getting pregnant again. All questions answered to their satisfaction.

## 2024-02-01 ENCOUNTER — NON-APPOINTMENT (OUTPATIENT)
Age: 29
End: 2024-02-01

## 2024-02-01 ENCOUNTER — APPOINTMENT (OUTPATIENT)
Dept: GYNECOLOGIC ONCOLOGY | Facility: CLINIC | Age: 29
End: 2024-02-01
Payer: COMMERCIAL

## 2024-02-01 VITALS — SYSTOLIC BLOOD PRESSURE: 138 MMHG | DIASTOLIC BLOOD PRESSURE: 81 MMHG

## 2024-02-01 VITALS
HEART RATE: 129 BPM | SYSTOLIC BLOOD PRESSURE: 153 MMHG | DIASTOLIC BLOOD PRESSURE: 88 MMHG | WEIGHT: 137 LBS | BODY MASS INDEX: 23.39 KG/M2 | OXYGEN SATURATION: 99 % | TEMPERATURE: 98.2 F | HEIGHT: 64 IN

## 2024-02-01 DIAGNOSIS — C56.9 MALIGNANT NEOPLASM OF UNSPECIFIED OVARY: ICD-10-CM

## 2024-02-01 PROCEDURE — 99213 OFFICE O/P EST LOW 20 MIN: CPT

## 2024-02-01 NOTE — HISTORY OF PRESENT ILLNESS
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     a) Genetic testing- declined 4/2022 a) negative tumor markers and colonoscopy and upper endoscopy 6/2021 2) Mother with premenopausal breast cancer at 48yo (BRCA negative)  Previous Therapy 1) Abdominal US 6/2/21     a) Large complex multiseptated cystic lesion in the abdomen and pelvis [id number] concerning for ovarian carcinoma, R sided mild hydronephrosis 2) CTAP 6/3/21   a) [id number] L adnexa mass imaging features consistent with mucinous cystic neoplasm 3) Exploratory Laparotomy, LSO, appendectomy and omentectomy 6/8/2021    a) washing negative, L ovary with G1 mucinous carcinoma, arising in background of a borderline mucinous tumor and mucinous cystadenoma, benign L tube, omentum and appendix 4. Pelvic US 11/3/21    a) unremarkable appearance of the right ovary. Left ovary surgically removed. 5. Left Breast bx at 8:00 3/31/2022 w mag seed c/w fibroadenoma 6) Pelvic US 4/2022 wnl   Here for surveillance visit. First visit in a while due to pregnancy, now about 12 weeks post partum. Vaginal delivery, no complications. Just had first menses 1/11/24. Developed incisional hernia during pregnancy and saw surgeon to discuss repair. Considering timing in terms of pregnancy and repair plan. Bowel/bladder habits at baseline.   Health maintenance Last pap June 2020, 2/22 wnl Colonoscopy 7/2021- normal  Breast imaging plan - start mg at 35ish baseline per breast surgery

## 2024-02-01 NOTE — PHYSICAL EXAM
[Chaperone Present] : A chaperone was present in the examining room during all aspects of the physical examination [Normal] : Recto-Vaginal Exam: Normal [Fully active, able to carry on all pre-disease performance without restriction] : Status 0 - Fully active, able to carry on all pre-disease performance without restriction [de-identified] : vertical incision intact, well healed. Small defect (5mm) at the inferior aspect of the umbilicus.

## 2024-02-01 NOTE — DISCUSSION/SUMMARY
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

## 2024-02-02 LAB
CANCER AG125 SERPL-ACNC: 10 U/ML
CANCER AG15-3 SERPL-ACNC: 16.8 U/ML
CANCER AG19-9 SERPL-ACNC: 10 U/ML

## 2024-02-22 ENCOUNTER — RESULT REVIEW (OUTPATIENT)
Age: 29
End: 2024-02-22

## 2024-02-27 DIAGNOSIS — K80.20 CALCULUS OF GALLBLADDER W/OUT CHOLECYSTITIS W/OUT OBSTRUCTION: ICD-10-CM

## 2024-02-27 DIAGNOSIS — R94.6 ABNORMAL RESULTS OF THYROID FUNCTION STUDIES: ICD-10-CM

## 2024-02-28 ENCOUNTER — APPOINTMENT (OUTPATIENT)
Dept: INTERNAL MEDICINE | Facility: CLINIC | Age: 29
End: 2024-02-28
Payer: COMMERCIAL

## 2024-02-28 DIAGNOSIS — R10.9 UNSPECIFIED ABDOMINAL PAIN: ICD-10-CM

## 2024-02-28 DIAGNOSIS — K92.1 MELENA: ICD-10-CM

## 2024-02-28 PROCEDURE — 99213 OFFICE O/P EST LOW 20 MIN: CPT

## 2024-02-29 ENCOUNTER — APPOINTMENT (OUTPATIENT)
Dept: GASTROENTEROLOGY | Facility: CLINIC | Age: 29
End: 2024-02-29
Payer: COMMERCIAL

## 2024-02-29 VITALS — HEIGHT: 64 IN | BODY MASS INDEX: 23.39 KG/M2 | WEIGHT: 137 LBS

## 2024-02-29 PROBLEM — K92.1 BLOOD IN STOOL: Status: ACTIVE | Noted: 2024-02-29

## 2024-02-29 LAB
ALBUMIN SERPL ELPH-MCNC: 4.8 G/DL
ALP BLD-CCNC: 78 U/L
ALT SERPL-CCNC: 12 U/L
ANION GAP SERPL CALC-SCNC: 15 MMOL/L
AST SERPL-CCNC: 10 U/L
BASOPHILS # BLD AUTO: 0.03 K/UL
BASOPHILS NFR BLD AUTO: 0.3 %
BILIRUB SERPL-MCNC: 0.5 MG/DL
BUN SERPL-MCNC: 11 MG/DL
CALCIUM SERPL-MCNC: 9.5 MG/DL
CHLORIDE SERPL-SCNC: 102 MMOL/L
CHOLEST SERPL-MCNC: 192 MG/DL
CO2 SERPL-SCNC: 20 MMOL/L
CREAT SERPL-MCNC: 0.72 MG/DL
EGFR: 116 ML/MIN/1.73M2
EOSINOPHIL # BLD AUTO: 0.14 K/UL
EOSINOPHIL NFR BLD AUTO: 1.6 %
GLUCOSE SERPL-MCNC: 103 MG/DL
HCT VFR BLD CALC: 42.9 %
HDLC SERPL-MCNC: 74 MG/DL
HGB BLD-MCNC: 13.4 G/DL
IMM GRANULOCYTES NFR BLD AUTO: 0.2 %
LDLC SERPL CALC-MCNC: 106 MG/DL
LYMPHOCYTES # BLD AUTO: 1.75 K/UL
LYMPHOCYTES NFR BLD AUTO: 20.4 %
MAN DIFF?: NORMAL
MCHC RBC-ENTMCNC: 27.2 PG
MCHC RBC-ENTMCNC: 31.2 GM/DL
MCV RBC AUTO: 87 FL
MONOCYTES # BLD AUTO: 0.59 K/UL
MONOCYTES NFR BLD AUTO: 6.9 %
NEUTROPHILS # BLD AUTO: 6.05 K/UL
NEUTROPHILS NFR BLD AUTO: 70.6 %
NONHDLC SERPL-MCNC: 118 MG/DL
PLATELET # BLD AUTO: 308 K/UL
POTASSIUM SERPL-SCNC: 3.9 MMOL/L
PROT SERPL-MCNC: 7.7 G/DL
RBC # BLD: 4.93 M/UL
RBC # FLD: 14.4 %
SODIUM SERPL-SCNC: 137 MMOL/L
T4 SERPL-MCNC: 10.7 UG/DL
TRIGL SERPL-MCNC: 68 MG/DL
TSH SERPL-ACNC: 0.49 UIU/ML
VIT B12 SERPL-MCNC: 475 PG/ML
WBC # FLD AUTO: 8.58 K/UL

## 2024-02-29 PROCEDURE — 99203 OFFICE O/P NEW LOW 30 MIN: CPT

## 2024-02-29 NOTE — HISTORY OF PRESENT ILLNESS
[FreeTextEntry1] : Ms. MYLES VO is a 28-year-old female with a PMH of ovarian cancer (stage 1) and hypothyroidism.  PSH: L salpingo-oophorectomy (2022), pectus excavatum surgery (childhood), L breast fibroma excision.   Presents for rectal bleeding x 4 days. She noticed a small streak of blood and small bright red blood on toilet paper.  She was not straining during her BM and denies rectal pain.  Gave birth to her son vaginally in October 2023 with 2nd degree laceration.  Unsure of any issues with hemorrhoids during the pregnancy.  She had a colonoscopy 2 years ago after ovarian cancer diagnosis that was normal. She did not require any chemotherapy or radiation due to early staging.  Already has a referral to see a  to assess for hereditary cancer syndromes.

## 2024-02-29 NOTE — ASSESSMENT
[FreeTextEntry1] : Rectal bleeding - Will treat as internal hemorrhoid, unable to appreciate cause of bleeding on rectal exam.  - Treatment: Apply Hydrocortisone ointment twice daily for 7 days.  - Increase fiber: Goal 25-35g/day. Start Benefiber supplement or Metamucil supplement 1 teaspoon in 8 ounces of liquid once daily.    - Water intake minimum 40 ounces per day.    - To soothe any discomfort can use Witch Hazel pads (Tucks) and/or warm water soaks (sitz bath). - History of ovarian cancer, will have her follow-up in 4-6 weeks, if bleeding continues will recommend repeat colonoscopy.

## 2024-02-29 NOTE — PHYSICAL EXAM
[Sclera] : the sclera and conjunctiva were normal [Chaperone Present: ____] : chaperone present: [unfilled] [Normal rectal exam] : was normal [None] : there were no anal fissures seen [Normal] : was normal [Excoriation] : no perianal excoriation [Fistula] : no fistulas [Ulcer ___ cm] : no ulcers [Wart] : no warts [Internal Hemorrhoid] : no internal hemorrhoids were present [External Hemorrhoid] : no external hemorrhoids were present [Occult Blood Positive] : was negative for occult blood

## 2024-02-29 NOTE — HISTORY OF PRESENT ILLNESS
[FreeTextEntry1] : Blood in her stool [de-identified] : Patient is here because she had blood in her stool with bowel movement.  Patient recently had an exposure to this area and would like to check her blood work for Listeria.  Otherwise she feels fine.  She has no major complaints.  She will be making an appointment with GI for possible hemorrhoids or fissures. Statement Selected

## 2024-02-29 NOTE — PHYSICAL EXAM
[No Acute Distress] : no acute distress [Well Nourished] : well nourished [Well Developed] : well developed [Well-Appearing] : well-appearing [Normal Sclera/Conjunctiva] : normal sclera/conjunctiva [PERRL] : pupils equal round and reactive to light [EOMI] : extraocular movements intact [Normal Outer Ear/Nose] : the outer ears and nose were normal in appearance [Normal Oropharynx] : the oropharynx was normal [No Lymphadenopathy] : no lymphadenopathy [No JVD] : no jugular venous distention [Supple] : supple [No Respiratory Distress] : no respiratory distress  [Thyroid Normal, No Nodules] : the thyroid was normal and there were no nodules present [No Accessory Muscle Use] : no accessory muscle use [Normal Rate] : normal rate  [Clear to Auscultation] : lungs were clear to auscultation bilaterally [Regular Rhythm] : with a regular rhythm [Normal S1, S2] : normal S1 and S2 [No Murmur] : no murmur heard [No Carotid Bruits] : no carotid bruits [No Abdominal Bruit] : a ~M bruit was not heard ~T in the abdomen [Pedal Pulses Present] : the pedal pulses are present [No Varicosities] : no varicosities [No Edema] : there was no peripheral edema [No Palpable Aorta] : no palpable aorta [No Extremity Clubbing/Cyanosis] : no extremity clubbing/cyanosis [Non Tender] : non-tender [Soft] : abdomen soft [Non-distended] : non-distended [No HSM] : no HSM [No Masses] : no abdominal mass palpated [Normal Bowel Sounds] : normal bowel sounds [Normal Posterior Cervical Nodes] : no posterior cervical lymphadenopathy [Normal Anterior Cervical Nodes] : no anterior cervical lymphadenopathy [No CVA Tenderness] : no CVA  tenderness [No Spinal Tenderness] : no spinal tenderness [No Joint Swelling] : no joint swelling [Grossly Normal Strength/Tone] : grossly normal strength/tone [Coordination Grossly Intact] : coordination grossly intact [No Rash] : no rash [No Focal Deficits] : no focal deficits [Normal Gait] : normal gait [Deep Tendon Reflexes (DTR)] : deep tendon reflexes were 2+ and symmetric [Normal Affect] : the affect was normal [Normal Insight/Judgement] : insight and judgment were intact

## 2024-03-04 ENCOUNTER — TRANSCRIPTION ENCOUNTER (OUTPATIENT)
Age: 29
End: 2024-03-04

## 2024-03-12 RX ORDER — LEVOTHYROXINE SODIUM 0.03 MG/1
25 TABLET ORAL DAILY
Qty: 90 | Refills: 3 | Status: ACTIVE | COMMUNITY
Start: 2022-08-12 | End: 1900-01-01

## 2024-04-08 ENCOUNTER — APPOINTMENT (OUTPATIENT)
Dept: GASTROENTEROLOGY | Facility: CLINIC | Age: 29
End: 2024-04-08

## 2024-05-01 DIAGNOSIS — N39.0 URINARY TRACT INFECTION, SITE NOT SPECIFIED: ICD-10-CM

## 2024-05-03 LAB
APPEARANCE: CLEAR
BILIRUBIN URINE: NEGATIVE
BLOOD URINE: NEGATIVE
COLOR: YELLOW
GLUCOSE QUALITATIVE U: NEGATIVE MG/DL
KETONES URINE: NEGATIVE MG/DL
LEUKOCYTE ESTERASE URINE: NEGATIVE
NITRITE URINE: NEGATIVE
PH URINE: 6
PROTEIN URINE: NEGATIVE MG/DL
SPECIFIC GRAVITY URINE: 1.01
UROBILINOGEN URINE: 0.2 MG/DL

## 2024-07-24 ENCOUNTER — APPOINTMENT (OUTPATIENT)
Dept: GASTROENTEROLOGY | Facility: CLINIC | Age: 29
End: 2024-07-24

## 2024-07-24 ENCOUNTER — NON-APPOINTMENT (OUTPATIENT)
Age: 29
End: 2024-07-24

## 2024-07-31 ENCOUNTER — NON-APPOINTMENT (OUTPATIENT)
Age: 29
End: 2024-07-31

## 2024-08-01 ENCOUNTER — APPOINTMENT (OUTPATIENT)
Dept: GYNECOLOGIC ONCOLOGY | Facility: CLINIC | Age: 29
End: 2024-08-01
Payer: COMMERCIAL

## 2024-08-01 VITALS
HEIGHT: 64 IN | SYSTOLIC BLOOD PRESSURE: 153 MMHG | DIASTOLIC BLOOD PRESSURE: 78 MMHG | WEIGHT: 137 LBS | BODY MASS INDEX: 23.39 KG/M2

## 2024-08-01 DIAGNOSIS — R30.0 DYSURIA: ICD-10-CM

## 2024-08-01 DIAGNOSIS — C56.9 MALIGNANT NEOPLASM OF UNSPECIFIED OVARY: ICD-10-CM

## 2024-08-01 PROCEDURE — 99214 OFFICE O/P EST MOD 30 MIN: CPT

## 2024-08-01 NOTE — DISCUSSION/SUMMARY
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 May consider IUD depending on timing of next pregnancy follow up in 6 months HCM - UTD

## 2024-08-01 NOTE — PHYSICAL EXAM
[Chaperone Present] : A chaperone was present in the examining room during all aspects of the physical examination [Normal] : Recto-Vaginal Exam: Normal [Fully active, able to carry on all pre-disease performance without restriction] : Status 0 - Fully active, able to carry on all pre-disease performance without restriction [de-identified] : vertical incision intact, well healed. Small defect (5mm) at the inferior aspect of the umbilicus.

## 2024-08-01 NOTE — HISTORY OF PRESENT ILLNESS
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     a) Genetic testing- declined 4/2022 a) negative tumor markers and colonoscopy and upper endoscopy 6/2021 2) Mother with premenopausal breast cancer at 46yo (BRCA negative)  Previous Therapy 1) Abdominal US 6/2/21     a) Large complex multiseptated cystic lesion in the abdomen and pelvis [id number] concerning for ovarian carcinoma, R sided mild hydronephrosis 2) CTAP 6/3/21   a) [id number] L adnexa mass imaging features consistent with mucinous cystic neoplasm 3) Exploratory Laparotomy, LSO, appendectomy and omentectomy 6/8/2021    a) washing negative, L ovary with G1 mucinous carcinoma, arising in background of a borderline mucinous tumor and mucinous cystadenoma, benign L tube, omentum and appendix 4. Pelvic US 11/3/21    a) unremarkable appearance of the right ovary. Left ovary surgically removed. 5. Left Breast bx at 8:00 3/31/2022 w mag seed c/w fibroadenoma 6) Pelvic US 4/2022 wnl  7) Pelvic US 2/2024 a) wnl  Here for surveillance visit. Feeling well, her son is now 9 months!  Last week went to urgent care with urinary urgency and cramping. Urine culture showed colonization of GBS, not treated as symptoms were improved. Denies bloating, bladder or bowel habit changes./ Has cramping with ovulation since delivery. Hernia is stable in symptoms.   Health maintenance Last pap 5/2024 wnl Colonoscopy 7/2021- normal  Breast imaging plan - start mammogram at 35ish baseline per breast surgery

## 2024-08-02 ENCOUNTER — TRANSCRIPTION ENCOUNTER (OUTPATIENT)
Age: 29
End: 2024-08-02

## 2024-08-02 LAB
CANCER AG125 SERPL-ACNC: 10 U/ML
CANCER AG15-3 SERPL-ACNC: 18.3 U/ML
CANCER AG19-9 SERPL-ACNC: 9 U/ML

## 2024-08-06 ENCOUNTER — APPOINTMENT (OUTPATIENT)
Dept: GYNECOLOGIC ONCOLOGY | Facility: CLINIC | Age: 29
End: 2024-08-06

## 2024-08-12 ENCOUNTER — OUTPATIENT (OUTPATIENT)
Dept: OUTPATIENT SERVICES | Facility: HOSPITAL | Age: 29
LOS: 1 days | Discharge: HOME | End: 2024-08-12

## 2024-08-12 DIAGNOSIS — Q67.6 PECTUS EXCAVATUM: Chronic | ICD-10-CM

## 2024-08-12 DIAGNOSIS — Z15.89 GENETIC SUSCEPTIBILITY TO OTHER DISEASE: ICD-10-CM

## 2024-08-16 ENCOUNTER — APPOINTMENT (OUTPATIENT)
Dept: INTERNAL MEDICINE | Facility: CLINIC | Age: 29
End: 2024-08-16
Payer: COMMERCIAL

## 2024-08-16 VITALS
DIASTOLIC BLOOD PRESSURE: 72 MMHG | RESPIRATION RATE: 16 BRPM | WEIGHT: 138 LBS | SYSTOLIC BLOOD PRESSURE: 114 MMHG | OXYGEN SATURATION: 98 % | BODY MASS INDEX: 23.56 KG/M2 | HEIGHT: 64 IN | HEART RATE: 96 BPM

## 2024-08-16 DIAGNOSIS — Z87.898 PERSONAL HISTORY OF OTHER SPECIFIED CONDITIONS: ICD-10-CM

## 2024-08-16 DIAGNOSIS — R59.0 LOCALIZED ENLARGED LYMPH NODES: ICD-10-CM

## 2024-08-16 DIAGNOSIS — N83.8 OTHER NONINFLAMMATORY DISORDERS OF OVARY, FALLOPIAN TUBE AND BROAD LIGAMENT: ICD-10-CM

## 2024-08-16 DIAGNOSIS — R10.9 UNSPECIFIED ABDOMINAL PAIN: ICD-10-CM

## 2024-08-16 DIAGNOSIS — Z80.3 FAMILY HISTORY OF MALIGNANT NEOPLASM OF BREAST: ICD-10-CM

## 2024-08-16 DIAGNOSIS — Z11.59 ENCOUNTER FOR SCREENING FOR OTHER VIRAL DISEASES: ICD-10-CM

## 2024-08-16 DIAGNOSIS — Z30.09 ENCOUNTER FOR OTHER GENERAL COUNSELING AND ADVICE ON CONTRACEPTION: ICD-10-CM

## 2024-08-16 DIAGNOSIS — R10.2 PELVIC AND PERINEAL PAIN: ICD-10-CM

## 2024-08-16 DIAGNOSIS — R94.31 ABNORMAL ELECTROCARDIOGRAM [ECG] [EKG]: ICD-10-CM

## 2024-08-16 DIAGNOSIS — Z11.3 ENCOUNTER FOR SCREENING FOR INFECTIONS WITH A PREDOMINANTLY SEXUAL MODE OF TRANSMISSION: ICD-10-CM

## 2024-08-16 DIAGNOSIS — K42.9 UMBILICAL HERNIA W/OUT OBSTRUCTION OR GANGRENE: ICD-10-CM

## 2024-08-16 DIAGNOSIS — R92.30 DENSE BREASTS, UNSPECIFIED: ICD-10-CM

## 2024-08-16 DIAGNOSIS — Z20.822 CONTACT WITH AND (SUSPECTED) EXPOSURE TO COVID-19: ICD-10-CM

## 2024-08-16 DIAGNOSIS — C56.9 MALIGNANT NEOPLASM OF UNSPECIFIED OVARY: ICD-10-CM

## 2024-08-16 DIAGNOSIS — Z79.899 OTHER LONG TERM (CURRENT) DRUG THERAPY: ICD-10-CM

## 2024-08-16 DIAGNOSIS — R07.89 OTHER CHEST PAIN: ICD-10-CM

## 2024-08-16 DIAGNOSIS — Z00.00 ENCOUNTER FOR GENERAL ADULT MEDICAL EXAMINATION W/OUT ABNORMAL FINDINGS: ICD-10-CM

## 2024-08-16 DIAGNOSIS — R19.00 INTRA-ABDOMINAL AND PELVIC SWELLING, MASS AND LUMP, UNSPECIFIED SITE: ICD-10-CM

## 2024-08-16 DIAGNOSIS — Z23 ENCOUNTER FOR IMMUNIZATION: ICD-10-CM

## 2024-08-16 DIAGNOSIS — R94.6 ABNORMAL RESULTS OF THYROID FUNCTION STUDIES: ICD-10-CM

## 2024-08-16 DIAGNOSIS — R92.8 OTHER ABNORMAL AND INCONCLUSIVE FINDINGS ON DIAGNOSTIC IMAGING OF BREAST: ICD-10-CM

## 2024-08-16 DIAGNOSIS — K92.1 MELENA: ICD-10-CM

## 2024-08-16 DIAGNOSIS — Z85.43 PERSONAL HISTORY OF MALIGNANT NEOPLASM OF OVARY: ICD-10-CM

## 2024-08-16 DIAGNOSIS — R73.09 OTHER ABNORMAL GLUCOSE: ICD-10-CM

## 2024-08-16 DIAGNOSIS — R93.89 ABNORMAL FINDINGS ON DIAGNOSTIC IMAGING OF OTHER SPECIFIED BODY STRUCTURES: ICD-10-CM

## 2024-08-16 DIAGNOSIS — D22.9 MELANOCYTIC NEVI, UNSPECIFIED: ICD-10-CM

## 2024-08-16 DIAGNOSIS — Z11.1 ENCOUNTER FOR SCREENING FOR RESPIRATORY TUBERCULOSIS: ICD-10-CM

## 2024-08-16 PROCEDURE — G0444 DEPRESSION SCREEN ANNUAL: CPT | Mod: 59

## 2024-08-16 PROCEDURE — 99213 OFFICE O/P EST LOW 20 MIN: CPT | Mod: 25

## 2024-08-16 PROCEDURE — 99395 PREV VISIT EST AGE 18-39: CPT

## 2024-08-19 ENCOUNTER — TRANSCRIPTION ENCOUNTER (OUTPATIENT)
Age: 29
End: 2024-08-19

## 2024-08-19 LAB
25(OH)D3 SERPL-MCNC: 21.9 NG/ML
ESTIMATED AVERAGE GLUCOSE: 103 MG/DL
HBA1C MFR BLD HPLC: 5.2 %

## 2024-08-19 RX ORDER — ADHESIVE TAPE 3"X 2.3 YD
50 MCG TAPE, NON-MEDICATED TOPICAL
Refills: 0 | Status: ACTIVE | COMMUNITY
Start: 2024-08-19

## 2024-08-19 NOTE — REVIEW OF SYSTEMS
[Palpitations] : no palpitations [Chest Pain] : no chest pain [Abdominal Pain] : no abdominal pain [Shortness Of Breath] : no shortness of breath [Nausea] : no nausea [Constipation] : no constipation [Diarrhea] : diarrhea [Vomiting] : no vomiting [Dysuria] : no dysuria [Hematuria] : no hematuria [Frequency] : no frequency [FreeTextEntry7] : No more blood in stool but will see GI

## 2024-08-19 NOTE — HISTORY OF PRESENT ILLNESS
[FreeTextEntry1] : Pt has here for annual PE and to go over chronic medical conditions. Patient is a 9-month-old baby boy Micheal.

## 2024-08-19 NOTE — REVIEW OF SYSTEMS
[Chest Pain] : no chest pain [Palpitations] : no palpitations [Shortness Of Breath] : no shortness of breath [Abdominal Pain] : no abdominal pain [Nausea] : no nausea [Constipation] : no constipation [Diarrhea] : diarrhea [Vomiting] : no vomiting [Dysuria] : no dysuria [Hematuria] : no hematuria [Frequency] : no frequency [FreeTextEntry7] : No more blood in stool but will see GI

## 2024-08-19 NOTE — HEALTH RISK ASSESSMENT
[0] : 2) Feeling down, depressed, or hopeless: Not at all (0) [PHQ-2 Negative - No further assessment needed] : PHQ-2 Negative - No further assessment needed [[id number]] : 0

## 2024-08-19 NOTE — HEALTH RISK ASSESSMENT
[0] : 2) Feeling down, depressed, or hopeless: Not at all (0) [PHQ-2 Negative - No further assessment needed] : PHQ-2 Negative - No further assessment needed [[id number]] : 0

## 2024-08-19 NOTE — PHYSICAL EXAM
[No Acute Distress] : no acute distress [Normal Voice/Communication] : normal voice/communication [No Lymphadenopathy] : no lymphadenopathy [Thyroid Normal, No Nodules] : the thyroid was normal and there were no nodules present [No Edema] : there was no peripheral edema [Normal Appearance] : normal in appearance [No Masses] : no palpable masses [No Nipple Discharge] : no nipple discharge [No Axillary Lymphadenopathy] : no axillary lymphadenopathy [Normal] : soft, non-tender, non-distended, no masses palpated, no HSM and normal bowel sounds [de-identified] : Pectus excavatum

## 2024-08-19 NOTE — PHYSICAL EXAM
[No Acute Distress] : no acute distress [Normal Voice/Communication] : normal voice/communication [No Lymphadenopathy] : no lymphadenopathy [Thyroid Normal, No Nodules] : the thyroid was normal and there were no nodules present [No Edema] : there was no peripheral edema [Normal Appearance] : normal in appearance [No Masses] : no palpable masses [No Nipple Discharge] : no nipple discharge [No Axillary Lymphadenopathy] : no axillary lymphadenopathy [Normal] : soft, non-tender, non-distended, no masses palpated, no HSM and normal bowel sounds [de-identified] : Pectus excavatum

## 2024-08-20 ENCOUNTER — TRANSCRIPTION ENCOUNTER (OUTPATIENT)
Age: 29
End: 2024-08-20

## 2024-08-28 ENCOUNTER — RESULT REVIEW (OUTPATIENT)
Age: 29
End: 2024-08-28

## 2024-08-29 ENCOUNTER — APPOINTMENT (OUTPATIENT)
Dept: GASTROENTEROLOGY | Facility: CLINIC | Age: 29
End: 2024-08-29

## 2024-08-29 ENCOUNTER — HOSPITAL ENCOUNTER (EMERGENCY)
Dept: HOSPITAL 74 - FER | Age: 29
Discharge: HOME | End: 2024-08-29
Payer: COMMERCIAL

## 2024-08-29 VITALS
HEART RATE: 125 BPM | SYSTOLIC BLOOD PRESSURE: 138 MMHG | RESPIRATION RATE: 17 BRPM | DIASTOLIC BLOOD PRESSURE: 89 MMHG | TEMPERATURE: 97.9 F

## 2024-08-29 VITALS — BODY MASS INDEX: 23.6 KG/M2

## 2024-08-29 DIAGNOSIS — S93.105A: Primary | ICD-10-CM
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
## 2024-08-29 PROCEDURE — 2W3VXYZ IMMOBILIZATION OF LEFT TOE USING OTHER DEVICE: ICD-10-PCS

## 2024-09-03 ENCOUNTER — TRANSCRIPTION ENCOUNTER (OUTPATIENT)
Age: 29
End: 2024-09-03

## 2024-09-06 ENCOUNTER — TRANSCRIPTION ENCOUNTER (OUTPATIENT)
Age: 29
End: 2024-09-06

## 2024-09-13 ENCOUNTER — TRANSCRIPTION ENCOUNTER (OUTPATIENT)
Age: 29
End: 2024-09-13

## 2024-09-17 ENCOUNTER — APPOINTMENT (OUTPATIENT)
Dept: HEMATOLOGY ONCOLOGY | Facility: CLINIC | Age: 29
End: 2024-09-17

## 2024-09-18 ENCOUNTER — APPOINTMENT (OUTPATIENT)
Dept: NEUROLOGY | Facility: CLINIC | Age: 29
End: 2024-09-18

## 2024-12-06 ENCOUNTER — APPOINTMENT (OUTPATIENT)
Dept: GASTROENTEROLOGY | Facility: CLINIC | Age: 29
End: 2024-12-06
Payer: COMMERCIAL

## 2024-12-06 VITALS — HEIGHT: 64 IN | BODY MASS INDEX: 23.56 KG/M2 | WEIGHT: 138 LBS

## 2024-12-06 DIAGNOSIS — K92.1 MELENA: ICD-10-CM

## 2024-12-06 PROCEDURE — 99204 OFFICE O/P NEW MOD 45 MIN: CPT

## 2024-12-06 RX ORDER — POLYETHYLENE GLYCOL 3350 17 G/17G
17 POWDER, FOR SOLUTION ORAL
Qty: 1 | Refills: 0 | Status: ACTIVE | COMMUNITY
Start: 2024-12-06 | End: 1900-01-01

## 2024-12-09 LAB — CRP SERPL-MCNC: <3 MG/L

## 2024-12-18 ENCOUNTER — TRANSCRIPTION ENCOUNTER (OUTPATIENT)
Age: 29
End: 2024-12-18

## 2025-01-09 ENCOUNTER — APPOINTMENT (OUTPATIENT)
Dept: GASTROENTEROLOGY | Facility: CLINIC | Age: 30
End: 2025-01-09

## 2025-01-15 ENCOUNTER — APPOINTMENT (OUTPATIENT)
Dept: CARDIOLOGY | Facility: CLINIC | Age: 30
End: 2025-01-15

## 2025-02-11 ENCOUNTER — APPOINTMENT (OUTPATIENT)
Dept: GASTROENTEROLOGY | Facility: CLINIC | Age: 30
End: 2025-02-11

## 2025-02-18 ENCOUNTER — TRANSCRIPTION ENCOUNTER (OUTPATIENT)
Age: 30
End: 2025-02-18

## 2025-03-06 ENCOUNTER — APPOINTMENT (OUTPATIENT)
Dept: GYNECOLOGIC ONCOLOGY | Facility: CLINIC | Age: 30
End: 2025-03-06

## 2025-03-07 ENCOUNTER — APPOINTMENT (OUTPATIENT)
Dept: GYNECOLOGIC ONCOLOGY | Facility: CLINIC | Age: 30
End: 2025-03-07

## 2025-03-25 ENCOUNTER — TRANSCRIPTION ENCOUNTER (OUTPATIENT)
Age: 30
End: 2025-03-25

## 2025-03-25 ENCOUNTER — APPOINTMENT (OUTPATIENT)
Dept: GYNECOLOGIC ONCOLOGY | Facility: CLINIC | Age: 30
End: 2025-03-25
Payer: COMMERCIAL

## 2025-03-25 VITALS
TEMPERATURE: 98.2 F | SYSTOLIC BLOOD PRESSURE: 128 MMHG | HEIGHT: 64 IN | WEIGHT: 132 LBS | DIASTOLIC BLOOD PRESSURE: 82 MMHG | BODY MASS INDEX: 22.53 KG/M2

## 2025-03-25 DIAGNOSIS — C56.9 MALIGNANT NEOPLASM OF UNSPECIFIED OVARY: ICD-10-CM

## 2025-03-25 PROCEDURE — 99214 OFFICE O/P EST MOD 30 MIN: CPT

## 2025-03-26 ENCOUNTER — TRANSCRIPTION ENCOUNTER (OUTPATIENT)
Age: 30
End: 2025-03-26

## 2025-03-26 LAB
CANCER AG125 SERPL-ACNC: 11 U/ML
CANCER AG15-3 SERPL-ACNC: 16.5 U/ML
CANCER AG19-9 SERPL-ACNC: 8 U/ML

## 2025-03-27 RX ORDER — PSYLLIUM HUSK (WITH SUGAR) 3 G/7 G
43 POWDER (GRAM) ORAL
Qty: 1 | Refills: 0 | Status: ACTIVE | COMMUNITY
Start: 2025-03-27 | End: 1900-01-01

## 2025-03-28 ENCOUNTER — TRANSCRIPTION ENCOUNTER (OUTPATIENT)
Age: 30
End: 2025-03-28

## 2025-03-31 ENCOUNTER — APPOINTMENT (OUTPATIENT)
Dept: GASTROENTEROLOGY | Facility: HOSPITAL | Age: 30
End: 2025-03-31

## 2025-03-31 ENCOUNTER — RESULT REVIEW (OUTPATIENT)
Age: 30
End: 2025-03-31

## 2025-03-31 ENCOUNTER — TRANSCRIPTION ENCOUNTER (OUTPATIENT)
Age: 30
End: 2025-03-31

## 2025-04-01 ENCOUNTER — TRANSCRIPTION ENCOUNTER (OUTPATIENT)
Age: 30
End: 2025-04-01

## 2025-04-10 ENCOUNTER — APPOINTMENT (OUTPATIENT)
Dept: GASTROENTEROLOGY | Facility: CLINIC | Age: 30
End: 2025-04-10
Payer: COMMERCIAL

## 2025-04-10 DIAGNOSIS — D12.6 BENIGN NEOPLASM OF COLON, UNSPECIFIED: ICD-10-CM

## 2025-04-10 PROCEDURE — 99214 OFFICE O/P EST MOD 30 MIN: CPT | Mod: 95

## 2025-05-08 ENCOUNTER — TRANSCRIPTION ENCOUNTER (OUTPATIENT)
Age: 30
End: 2025-05-08

## 2025-05-08 ENCOUNTER — RESULT REVIEW (OUTPATIENT)
Age: 30
End: 2025-05-08

## 2025-05-09 ENCOUNTER — TRANSCRIPTION ENCOUNTER (OUTPATIENT)
Age: 30
End: 2025-05-09

## 2025-05-14 ENCOUNTER — NON-APPOINTMENT (OUTPATIENT)
Age: 30
End: 2025-05-14

## 2025-06-19 ENCOUNTER — TRANSCRIPTION ENCOUNTER (OUTPATIENT)
Age: 30
End: 2025-06-19

## 2025-06-24 ENCOUNTER — TRANSCRIPTION ENCOUNTER (OUTPATIENT)
Age: 30
End: 2025-06-24

## 2025-08-19 ENCOUNTER — APPOINTMENT (OUTPATIENT)
Dept: FAMILY MEDICINE | Facility: CLINIC | Age: 30
End: 2025-08-19

## 2025-08-27 ENCOUNTER — LABORATORY RESULT (OUTPATIENT)
Age: 30
End: 2025-08-27

## 2025-08-27 ENCOUNTER — APPOINTMENT (OUTPATIENT)
Dept: FAMILY MEDICINE | Facility: CLINIC | Age: 30
End: 2025-08-27
Payer: COMMERCIAL

## 2025-08-27 VITALS
RESPIRATION RATE: 16 BRPM | TEMPERATURE: 98.7 F | OXYGEN SATURATION: 100 % | BODY MASS INDEX: 22.88 KG/M2 | HEART RATE: 100 BPM | DIASTOLIC BLOOD PRESSURE: 84 MMHG | SYSTOLIC BLOOD PRESSURE: 129 MMHG | HEIGHT: 64 IN | WEIGHT: 134 LBS

## 2025-08-27 DIAGNOSIS — D12.6 BENIGN NEOPLASM OF COLON, UNSPECIFIED: ICD-10-CM

## 2025-08-27 DIAGNOSIS — C56.9 MALIGNANT NEOPLASM OF UNSPECIFIED OVARY: ICD-10-CM

## 2025-08-27 DIAGNOSIS — Z00.00 ENCOUNTER FOR GENERAL ADULT MEDICAL EXAMINATION W/OUT ABNORMAL FINDINGS: ICD-10-CM

## 2025-08-27 DIAGNOSIS — E55.9 VITAMIN D DEFICIENCY, UNSPECIFIED: ICD-10-CM

## 2025-08-27 DIAGNOSIS — Q67.6 PECTUS EXCAVATUM: ICD-10-CM

## 2025-08-27 DIAGNOSIS — G43.909 MIGRAINE, UNSPECIFIED, NOT INTRACTABLE, W/OUT STATUS MIGRAINOSUS: ICD-10-CM

## 2025-08-27 PROCEDURE — 99385 PREV VISIT NEW AGE 18-39: CPT

## 2025-08-27 PROCEDURE — 99204 OFFICE O/P NEW MOD 45 MIN: CPT | Mod: 25

## 2025-09-05 ENCOUNTER — TRANSCRIPTION ENCOUNTER (OUTPATIENT)
Age: 30
End: 2025-09-05

## 2025-09-05 LAB
25(OH)D3 SERPL-MCNC: 33.7 NG/ML
ALBUMIN SERPL ELPH-MCNC: 4.8 G/DL
ALP BLD-CCNC: 66 U/L
ALT SERPL-CCNC: 14 U/L
ANION GAP SERPL CALC-SCNC: 14 MMOL/L
APPEARANCE: CLEAR
AST SERPL-CCNC: 15 U/L
BILIRUB SERPL-MCNC: 0.5 MG/DL
BILIRUBIN URINE: NEGATIVE
BLOOD URINE: ABNORMAL
BUN SERPL-MCNC: 9 MG/DL
CALCIUM SERPL-MCNC: 9.5 MG/DL
CHLORIDE SERPL-SCNC: 103 MMOL/L
CHOLEST SERPL-MCNC: 203 MG/DL
CO2 SERPL-SCNC: 21 MMOL/L
COLOR: YELLOW
CREAT SERPL-MCNC: 0.67 MG/DL
EGFRCR SERPLBLD CKD-EPI 2021: 120 ML/MIN/1.73M2
ESTIMATED AVERAGE GLUCOSE: 105 MG/DL
GLUCOSE QUALITATIVE U: NEGATIVE MG/DL
GLUCOSE SERPL-MCNC: 91 MG/DL
HBA1C MFR BLD HPLC: 5.3 %
HCT VFR BLD CALC: 38.2 %
HDLC SERPL-MCNC: 72 MG/DL
HGB BLD-MCNC: 12.4 G/DL
KETONES URINE: 15 MG/DL
LDLC SERPL-MCNC: 120 MG/DL
LEUKOCYTE ESTERASE URINE: NEGATIVE
MCHC RBC-ENTMCNC: 26.8 PG
MCHC RBC-ENTMCNC: 32.5 G/DL
MCV RBC AUTO: 82.7 FL
NITRITE URINE: NEGATIVE
NONHDLC SERPL-MCNC: 131 MG/DL
PH URINE: 6
PLATELET # BLD AUTO: 255 K/UL
POTASSIUM SERPL-SCNC: 4.5 MMOL/L
PROT SERPL-MCNC: 7.5 G/DL
PROTEIN URINE: NEGATIVE MG/DL
RBC # BLD: 4.62 M/UL
RBC # FLD: 14.7 %
SODIUM SERPL-SCNC: 138 MMOL/L
SPECIFIC GRAVITY URINE: 1.01
T3FREE SERPL-MCNC: 2.86 PG/ML
T4 FREE SERPL-MCNC: 1.5 NG/DL
TRIGL SERPL-MCNC: 59 MG/DL
TSH SERPL-ACNC: 1.29 UIU/ML
UROBILINOGEN URINE: 0.2 MG/DL
WBC # FLD AUTO: 7.27 K/UL